# Patient Record
Sex: MALE | Race: WHITE | NOT HISPANIC OR LATINO | Employment: FULL TIME | URBAN - METROPOLITAN AREA
[De-identification: names, ages, dates, MRNs, and addresses within clinical notes are randomized per-mention and may not be internally consistent; named-entity substitution may affect disease eponyms.]

---

## 2017-04-12 ENCOUNTER — OFFICE VISIT (OUTPATIENT)
Dept: URGENT CARE | Age: 52
End: 2017-04-12
Payer: COMMERCIAL

## 2017-04-12 ENCOUNTER — TRANSCRIBE ORDERS (OUTPATIENT)
Dept: ADMINISTRATIVE | Age: 52
End: 2017-04-12

## 2017-04-12 ENCOUNTER — HOSPITAL ENCOUNTER (OUTPATIENT)
Dept: RADIOLOGY | Age: 52
Discharge: HOME | End: 2017-04-12
Attending: PHYSICIAN ASSISTANT | Admitting: FAMILY MEDICINE
Payer: COMMERCIAL

## 2017-04-12 DIAGNOSIS — M54.2 CERVICALGIA: ICD-10-CM

## 2017-04-12 PROCEDURE — 99283 EMERGENCY DEPT VISIT LOW MDM: CPT | Performed by: FAMILY MEDICINE

## 2017-04-12 PROCEDURE — 72040 X-RAY EXAM NECK SPINE 2-3 VW: CPT

## 2017-04-12 PROCEDURE — G0382 LEV 3 HOSP TYPE B ED VISIT: HCPCS | Performed by: FAMILY MEDICINE

## 2017-04-13 ENCOUNTER — HOSPITAL ENCOUNTER (EMERGENCY)
Facility: HOSPITAL | Age: 52
Discharge: HOME | End: 2017-04-13
Admitting: EMERGENCY MEDICINE
Payer: COMMERCIAL

## 2017-04-13 VITALS
HEART RATE: 74 BPM | RESPIRATION RATE: 20 BRPM | WEIGHT: 154 LBS | TEMPERATURE: 98.4 F | SYSTOLIC BLOOD PRESSURE: 116 MMHG | DIASTOLIC BLOOD PRESSURE: 92 MMHG | OXYGEN SATURATION: 96 %

## 2017-04-13 DIAGNOSIS — M54.2 CHRONIC NECK PAIN: Primary | ICD-10-CM

## 2017-04-13 DIAGNOSIS — G89.29 CHRONIC NECK PAIN: Primary | ICD-10-CM

## 2017-04-13 PROCEDURE — 99283 EMERGENCY DEPT VISIT LOW MDM: CPT

## 2018-12-20 ENCOUNTER — HOSPITAL ENCOUNTER (EMERGENCY)
Facility: HOSPITAL | Age: 53
Discharge: HOME | End: 2018-12-20
Attending: EMERGENCY MEDICINE | Admitting: EMERGENCY MEDICINE
Payer: COMMERCIAL

## 2018-12-20 VITALS
HEART RATE: 89 BPM | DIASTOLIC BLOOD PRESSURE: 95 MMHG | TEMPERATURE: 96.7 F | OXYGEN SATURATION: 98 % | WEIGHT: 165 LBS | RESPIRATION RATE: 18 BRPM | SYSTOLIC BLOOD PRESSURE: 149 MMHG

## 2018-12-20 DIAGNOSIS — F22 PARANOID DELUSION (HCC): Primary | ICD-10-CM

## 2018-12-20 LAB — ETHANOL EXG-MCNC: 0 MG/DL

## 2018-12-20 PROCEDURE — 82075 ASSAY OF BREATH ETHANOL: CPT | Performed by: EMERGENCY MEDICINE

## 2018-12-20 PROCEDURE — 99284 EMERGENCY DEPT VISIT MOD MDM: CPT

## 2018-12-20 NOTE — ED ATTENDING ATTESTATION
Jie Landa DO, saw and evaluated the patient  I have discussed the patient with the resident/non-physician practitioner and agree with the resident's/non-physician practitioner's findings, Plan of Care, and MDM as documented in the resident's/non-physician practitioner's note, except where noted  All available labs and Radiology studies were reviewed  At this point I agree with the current assessment done in the Emergency Department  I have conducted an independent evaluation of this patient a history and physical is as follows:    Patient is a 45-year-old male with no known past psychiatric history presenting for a psychiatric evaluation  Patient came to the hospital voluntarily by himself  He states that he is concerned because he was sleeping and thinks simply placed a metal device in his left ear or his the right ear or even his right ankle  He appears slightly anxious but denies any SI or HI  Denies any recent alcohol or drug abuse  Slightly pressured speech but otherwise the answering questions appropriately  Patient is afebrile, no signs of external trauma  The vital signs reviewed within normal range  Does not appear intoxicated  Does not appear under the influence of any narcotics or drugs  Patient was seen and evaluated by our crisis liaison  He did not wish to seek inpatient psychiatric care at this time  He was offered outpatient resources which she declined as well  Patient again denies SI or HI and since he came here voluntarily, patient be allowed to be discharged home  Instructed to return if condition worsens and follow up with his primary care physician  Patient was again instructed that he can return to the emergency department any time she wishes to seek treatment      Critical Care Time  CritCare Time    Procedures

## 2018-12-20 NOTE — ED NOTES
Pt is a 48 y o  male who was brought to the ED to get an axam to determine if he has some sort of device placed in his ear  Patient was very scattered and could not relate how he knew that the device was there or what it was doing to his body  Patient reports that it is the department of human services that put the device there because he has been living on a porch and they aren't happy about that  He states that they are trying to push him out and this is how they are going to do it  Patient expressed that he's been to the police station up to 10 times telling them this but there has been no evidence  Patient then stated that he wanted to go to his regular doctors who would be able to help him better; he had no insight into his actions to come here on his own regarding this issue  Patient then stated that he is very smart, has a very high IQ and so his senses are heightened and he knows that it is there  Patient also expressed that he uses Q-tips and therefore the device is probably lodged too far down for anyone but a specialist to find  Patient denies any mental health history and denies D&A use  Patient denies homicidal/suicidal ideations and auditory/visual hallucinations  Patient is not interested in any treatment at this time and is asking to leave  Discussed with doctor who is agreeable to such  Chief Complaint   Patient presents with    Psychiatric Evaluation     "pt states that the police sent him here to see if there is a metal device in his left ear that was placed while he was sleeping also stated that maybe the device is in his right ear or his ankle"  pt anxious during the triage process repeating that he has a metal object stuck in his body       Intake Assessment completed, Safety risk Assessment completed    BACILIO Dale  12/20/18   [id number]

## 2018-12-20 NOTE — DISCHARGE INSTRUCTIONS
Psychotic Disorder   WHAT YOU NEED TO KNOW:   A psychotic disorder is a medical condition that causes hallucinations and delusions  Hallucinations are seeing, hearing, tasting, or feeling things that are not real  Delusions are beliefs that something is real, true, or right when it is not  These false beliefs do not go away even if there is proof that they are not true  You may believe someone is spying on you, after you, or controlling your mind  You may also believe there is something wrong with how your body works  Schizophrenia and schizoaffective disorder are examples of psychotic disorders  DISCHARGE INSTRUCTIONS:   Call 911 if:   · You feel like you could harm yourself or someone else  Contact your healthcare provider if:   · Your symptoms do not improve  · You cannot make it to your next appointment  · You have new symptoms  · You have questions or concerns about your condition or care  Medicines  may be given to decrease your symptoms  You may need 1 or more medicines  You may need to take your medicine for several weeks before you begin to feel better  Tell your healthcare provider about any side effects or problems you have with your medicines  The type or amount of medicine may need to be changed  Get support: It may be difficult to cope with your illness  You may feel lonely, anxious, or depressed  It may help to join a support group  A support group lets you talk with others who have a mental illness  For information and more support visit:  · [facility]  [address] [facility]  , [address] [place] [address]  Phone: [phone]  Phone: [phone]  Web Address: http://"[employer]/  [employer]  Self-care:   · Do not drink alcohol or use illegal drugs  Alcohol and illegal drugs can make your symptoms worse  Ask your healthcare provider for information if you currently drink alcohol or use illegal drugs and need help to quit  · Do not smoke    Nicotine and other chemicals in cigarettes and cigars can cause lung damage  They can also decrease how well your medicine works  Ask your healthcare provider for information if you currently smoke and need help to quit  E-cigarettes or smokeless tobacco still contain nicotine  Talk to your healthcare provider before you use these products  · Exercise regularly  Exercise can help improve your mood and decrease symptoms  Ask about the best exercise plan for you  · Manage your stress  Stress can make your condition worse  Ask your healthcare provider for more information about practicing mindfulness and deep breathing exercises to help decrease your stress  You may learn other ways to manage stress during therapy  Follow up with your healthcare provider as directed:  Write down your questions so you remember to ask them during your visits  © 2017 [facility] Information is for End User's use only and may not be sold, redistributed or otherwise used for commercial purposes  All illustrations and images included in CareNotes® are the copyrighted property of [employer]  or Florian Swift  The above information is an  only  It is not intended as medical advice for individual conditions or treatments  Talk to your doctor, nurse or pharmacist before following any medical regimen to see if it is safe and effective for you

## 2018-12-21 NOTE — ED PROVIDER NOTES
History  Chief Complaint   Patient presents with    Psychiatric Evaluation     "pt states that the police sent him here to see if there is a metal device in his left ear that was placed while he was sleeping also stated that maybe the device is in his right ear or his ankle"  pt anxious during the triage process repeating that he has a metal object stuck in his body  27-year-old male otherwise healthy presenting to the emergency department with paranoid delusions that he has had piece of metal implanted in his left ear or in his left ankle  Patient states he has been kicked out of his home by his wife and has been living on his back porch and that the police were called he was at the police station today and told them that he was going to voluntarily come to the emergency department for evaluation of this metal object he claims to have in his head  Patient does not know who put this metal object in his head or why but knows that 1 is there and is requesting imaging  Patient denies any other symptoms he denies any suicidal or homicidal ideation denies any hallucinations denies any previous psychiatric history  Patient's remaining ROS is negative  Patient does not appear to be responding to internal stimuli but has a rapid pressured speech and is obviously having due to usual paranoid thoughts  Psychiatric Evaluation   Presenting symptoms: no agitation, no hallucinations, no suicidal thoughts, no suicidal threats and no suicide attempt    Degree of incapacity (severity):   Moderate  Onset quality:  Sudden  Timing:  Constant  Progression:  Unchanged  Chronicity:  New  Context: not alcohol use, not drug abuse, not medication and not noncompliant    Treatment compliance:  Untreated  Relieved by:  None tried  Ineffective treatments:  None tried  Associated symptoms: anxiety    Associated symptoms: no abdominal pain, no chest pain, no fatigue and no headaches        Prior to Admission Medications Prescriptions Last Dose Informant Patient Reported? Taking? CHROMIUM GTF PO   Yes No   Sig: Take 1 capsule by mouth Pt does not know dosage      Facility-Administered Medications: None       History reviewed  No pertinent past medical history  History reviewed  No pertinent surgical history  History reviewed  No pertinent family history  I have reviewed and agree with the history as documented  Social History   Substance Use Topics    Smoking status: Current Every Day Smoker     Packs/day: 0 50    Smokeless tobacco: Not on file    Alcohol use Yes      Comment: socially        Review of Systems   Constitutional: Negative for chills, fatigue and fever  HENT: Negative for sore throat  Eyes: Negative for visual disturbance  Respiratory: Negative for shortness of breath  Cardiovascular: Negative for chest pain  Gastrointestinal: Negative for abdominal pain, constipation, diarrhea, nausea and vomiting  Genitourinary: Negative for difficulty urinating, dysuria and hematuria  Musculoskeletal: Negative for arthralgias  Skin: Negative for rash  Neurological: Negative for syncope, weakness and headaches  Hematological: Negative for adenopathy  Psychiatric/Behavioral: Negative for agitation, behavioral problems, hallucinations and suicidal ideas  The patient is nervous/anxious and is hyperactive  All other systems reviewed and are negative        Physical Exam  ED Triage Vitals   Temperature Pulse Respirations Blood Pressure SpO2   12/20/18 1353 12/20/18 1353 12/20/18 1353 12/20/18 1354 12/20/18 1353   (!) 96 7 °F (35 9 °C) 89 18 149/95 98 %      Temp Source Heart Rate Source Patient Position - Orthostatic VS BP Location FiO2 (%)   12/20/18 1353 -- -- -- --   Tympanic          Pain Score       12/20/18 1353       No Pain           Orthostatic Vital Signs  Vitals:    12/20/18 1353 12/20/18 1354   BP:  149/95   Pulse: 89        Physical Exam   Constitutional: He is oriented to person, place, and time  He appears well-developed and well-nourished  HENT:   Head: Normocephalic and atraumatic  Eyes: Conjunctivae and EOM are normal  No scleral icterus  Neck: Normal range of motion  Neck supple  Cardiovascular: Normal rate and regular rhythm  No murmur heard  Pulmonary/Chest: Effort normal and breath sounds normal    Abdominal: Soft  Bowel sounds are normal  There is no tenderness  Musculoskeletal: Normal range of motion  Neurological: He is alert and oriented to person, place, and time  Skin: Skin is warm and dry  Psychiatric: He has a normal mood and affect  His speech is rapid and/or pressured and tangential  He is hyperactive  He expresses no homicidal and no suicidal ideation  He expresses no suicidal plans and no homicidal plans  Nursing note and vitals reviewed  ED Medications  Medications - No data to display    Diagnostic Studies  Results Reviewed     Procedure Component Value Units Date/Time    POCT alcohol breath test [[id number]]  (Normal) Resulted:  12/20/18 1452    Lab Status:  Final result Updated:  12/20/18 1452     EXTBreath Alcohol 0 000                 No orders to display         Procedures  Procedures      Phone Consults  ED Phone Contact    ED Course                               MDM  Number of Diagnoses or Management Options  Paranoid delusion [facility]): new and requires workup  Diagnosis management comments: 55-year-old male presenting with delusional thoughts, will order urine drug screen as well as breath alcohol test and have crisis evaluate the patient  After residents initial evaluation patient states he wants to leave AMA due to the pure are see of his time in the emergency department and as patient does not have any suicidal or homicidal ideation is not actively hallucinating or threatening anyone conversation was had with crisis and attending physician that the patient can leave AMA with follow-up with his PCP and other outside providers  Amount and/or Complexity of Data Reviewed  Clinical lab tests: ordered and reviewed  Tests in the medicine section of CPT®: ordered and reviewed  Obtain history from someone other than the patient: yes  Review and summarize past medical records: yes  Discuss the patient with other providers: yes      CritCare Time    Disposition  Final diagnoses:   Paranoid delusion ([address] )     Time reflects when diagnosis was documented in both MDM as applicable and the Disposition within this note     Time User Action Codes Description Comment    12/20/2018  3:46 PM Jennifer Richards Add [F22] Paranoid delusion [facility])       ED Disposition     ED Disposition Condition Comment    Discharge  Margaret Ferrer discharge to home/self care  Condition at discharge: Stable        Follow-up Information     Follow up With Specialties Details Why Contact Info Additional Information    J  Earnest Saldaña MD Internal Medicine   [address] [id number] [address]  Emergency Department Emergency Medicine   [address]  [phone]  ED, [address] [place] [address], [address] Psychiatry   [address] [facility],[address] [id number]  [address] [place] [address], [phone]          Discharge Medication List as of 12/20/2018  3:51 PM      CONTINUE these medications which have NOT CHANGED    Details   CHROMIUM GTF PO Take 1 capsule by mouth Pt does not know dosage, Until Discontinued, Historical Med           No discharge procedures on file  ED Provider  Attending physically available and evaluated Margaret Ferrer I managed the patient along with the ED Attending      Electronically Signed by         Jaime Gallardo MD  12/20/18 2023

## 2019-07-24 ENCOUNTER — HOSPITAL ENCOUNTER (EMERGENCY)
Facility: HOSPITAL | Age: 54
Discharge: HOME | End: 2019-07-24
Attending: EMERGENCY MEDICINE
Payer: COMMERCIAL

## 2019-07-24 VITALS
RESPIRATION RATE: 18 BRPM | HEART RATE: 76 BPM | SYSTOLIC BLOOD PRESSURE: 137 MMHG | TEMPERATURE: 97.4 F | DIASTOLIC BLOOD PRESSURE: 91 MMHG | OXYGEN SATURATION: 98 %

## 2019-07-24 DIAGNOSIS — H61.23 BILATERAL IMPACTED CERUMEN: Primary | ICD-10-CM

## 2019-07-24 PROCEDURE — 69209 REMOVE IMPACTED EAR WAX UNI: CPT | Performed by: EMERGENCY MEDICINE

## 2019-07-24 PROCEDURE — 99283 EMERGENCY DEPT VISIT LOW MDM: CPT | Performed by: EMERGENCY MEDICINE

## 2019-07-24 PROCEDURE — 99283 EMERGENCY DEPT VISIT LOW MDM: CPT

## 2019-07-24 RX ORDER — DOCUSATE SODIUM 100 MG/1
100 CAPSULE, LIQUID FILLED ORAL ONCE
Status: COMPLETED | OUTPATIENT
Start: 2019-07-24 | End: 2019-07-24

## 2019-07-24 RX ADMIN — DOCUSATE SODIUM 100 MG: 100 CAPSULE, LIQUID FILLED ORAL at 20:57

## 2019-07-24 NOTE — ED ATTENDING ATTESTATION
Charisse Peck MD, saw and evaluated the patient  I have discussed the patient with the resident/non-physician practitioner and agree with the resident's/non-physician practitioner's findings, Plan of Care, and MDM as documented in the resident's/non-physician practitioner's note, except where noted  All available labs and Radiology studies were reviewed  I was present for key portions of any procedure(s) performed by the resident/non-physician practitioner and I was immediately available to provide assistance  At this point I agree with the current assessment done in the Emergency Department  I have conducted an independent evaluation of this patient a history and physical is as follows:     Pt presents with bilat cerumen impaction Pt states he has been using debrox without relief Pt has difficulty hearing  NO pain no drainage PE: alert bilat cerumen impaction MDM: will do irrigation  Critical Care Time  Procedures

## 2019-07-25 NOTE — ED PROVIDER NOTES
History  Chief Complaint   Patient presents with    Hearing Problem     "I can't hear at all, both of my ears are shut  It's a hazard for me to drive or work " Pt reports hearing worsening over the past week  Everton Livingston is a 48y o  year old Male with PMH of cerumen impaction presenting to the [facility] beds at the [facility] ED for decreased hearing  Worsening over past 2 days  Using debrox w/o relief  Denies congestion, rhinorrhea, veritgo  ROS negative  History provided by:  Patient   used: No        Prior to Admission Medications   Prescriptions Last Dose Informant Patient Reported? Taking? CHROMIUM GTF PO Not Taking at Unknown time  Yes No   Sig: Take 1 capsule by mouth Pt does not know dosage      Facility-Administered Medications: None       History reviewed  No pertinent past medical history  History reviewed  No pertinent surgical history  History reviewed  No pertinent family history  I have reviewed and agree with the history as documented  Social History     Tobacco Use    Smoking status: Current Every Day Smoker     Packs/day: 0 50   Substance Use Topics    Alcohol use: Yes     Comment: socially    Drug use: No        Review of Systems   Constitutional: Negative for chills, diaphoresis and fever  HENT: Negative for nosebleeds and rhinorrhea  Respiratory: Negative for cough, chest tightness, shortness of breath and wheezing  Cardiovascular: Negative for chest pain, palpitations and leg swelling  Gastrointestinal: Negative for abdominal distention, abdominal pain, constipation, diarrhea, nausea and vomiting  Genitourinary: Negative for dysuria, flank pain and hematuria  Musculoskeletal: Negative for arthralgias and myalgias  Neurological: Negative for seizures and syncope  Psychiatric/Behavioral: Negative for confusion  All other systems reviewed and are negative        Physical Exam  ED Triage Vitals [07/24/19 1845]   Temperature Pulse Respirations Blood Pressure SpO2   (!) 97 4 °F (36 3 °C) 76 18 137/91 98 %      Temp Source Heart Rate Source Patient Position - Orthostatic VS BP Location FiO2 (%)   Oral Monitor Sitting Left arm --      Pain Score       --             Orthostatic Vital Signs  Vitals:    07/24/19 1845   BP: 137/91   Pulse: 76   Patient Position - Orthostatic VS: Sitting       Physical Exam   Constitutional: He is oriented to person, place, and time  He appears well-developed and well-nourished  HENT:   Right Ear: No tenderness  Decreased hearing is noted  Left Ear: No tenderness  Decreased hearing is noted  B/l cerumen impaction  No erythema  No external tenderness  Cardiovascular: Normal rate and regular rhythm  No murmur heard  Pulmonary/Chest: Effort normal and breath sounds normal  No respiratory distress  He has no wheezes  He has no rales  Abdominal: Soft  Bowel sounds are normal  There is no tenderness  There is no rebound and no guarding  Neurological: He is alert and oriented to person, place, and time  Skin: Skin is warm  Capillary refill takes less than 2 seconds  Psychiatric: He has a normal mood and affect  His behavior is normal    Nursing note and vitals reviewed  ED Medications  Medications   docusate sodium (COLACE) capsule 100 mg (100 mg Oral Given 7/24/19 2057)       Diagnostic Studies  Results Reviewed     None                 No orders to display         Procedures  Procedures        ED Course  ED Course as of Jul 24 2240 Wed Jul 24, 2019 2130 Ear irrigated with warm water and colace  Removal of moderate amount of cerumen  Hearing improved  Will DC w/ RTED and follow up w/ PCP as needed                                    MDM    Disposition  Final diagnoses:   Bilateral impacted cerumen     Time reflects when diagnosis was documented in both MDM as applicable and the Disposition within this note     Time User Action Codes Description Comment    7/24/2019  8:55 PM Natalie Glass [A85 [id number]] Bilateral impacted cerumen       ED Disposition     ED Disposition Condition Date/Time Comment    Discharge Stable Wed Jul 24, 2019  8:55 PM Yaneth Nur discharge to home/self care  Follow-up Information     Follow up With Specialties Details Why Contact Wilberto Zuñiga MD Internal Medicine Schedule an appointment as soon as possible for a visit  To make appointment for reevaluation in 3-5 days  [address]  [address]  [phone]            Discharge Medication List as of 7/24/2019  9:29 PM      CONTINUE these medications which have NOT CHANGED    Details   CHROMIUM GTF PO Take 1 capsule by mouth Pt does not know dosage, Until Discontinued, Historical Med           No discharge procedures on file  ED Provider  Attending physically available and evaluated Yaneth Nur I managed the patient along with the ED Attending      Electronically Signed by Ruslan Mcallister W Chanda , DO  07/24/19 0638

## 2019-07-31 ENCOUNTER — HOSPITAL ENCOUNTER (EMERGENCY)
Facility: HOSPITAL | Age: 54
Discharge: HOME | End: 2019-07-31
Attending: EMERGENCY MEDICINE
Payer: COMMERCIAL

## 2019-07-31 VITALS
SYSTOLIC BLOOD PRESSURE: 135 MMHG | TEMPERATURE: 98.4 F | HEIGHT: 69 IN | WEIGHT: 162 LBS | HEART RATE: 75 BPM | DIASTOLIC BLOOD PRESSURE: 88 MMHG | RESPIRATION RATE: 20 BRPM | BODY MASS INDEX: 23.99 KG/M2 | OXYGEN SATURATION: 97 %

## 2019-07-31 DIAGNOSIS — K04.7 DENTAL INFECTION: Primary | ICD-10-CM

## 2019-07-31 PROCEDURE — 99283 EMERGENCY DEPT VISIT LOW MDM: CPT | Performed by: EMERGENCY MEDICINE

## 2019-07-31 PROCEDURE — 99283 EMERGENCY DEPT VISIT LOW MDM: CPT

## 2019-07-31 RX ORDER — CLINDAMYCIN HYDROCHLORIDE 300 MG/1
300 CAPSULE ORAL EVERY 6 HOURS
Qty: 28 CAPSULE | Refills: 0 | Status: SHIPPED | OUTPATIENT
Start: 2019-07-31 | End: 2019-08-07

## 2019-07-31 RX ORDER — CLINDAMYCIN HYDROCHLORIDE 150 MG/1
300 CAPSULE ORAL ONCE
Status: COMPLETED | OUTPATIENT
Start: 2019-07-31 | End: 2019-07-31

## 2019-07-31 RX ORDER — NAPROXEN 500 MG/1
500 TABLET ORAL ONCE
Status: COMPLETED | OUTPATIENT
Start: 2019-07-31 | End: 2019-07-31

## 2019-07-31 RX ORDER — CHLORHEXIDINE GLUCONATE 0.12 MG/ML
RINSE ORAL
Qty: 120 ML | Refills: 0 | Status: SHIPPED | OUTPATIENT
Start: 2019-07-31 | End: 2019-09-05

## 2019-07-31 RX ADMIN — CLINDAMYCIN HYDROCHLORIDE 300 MG: 150 CAPSULE ORAL at 01:12

## 2019-07-31 RX ADMIN — NAPROXEN 500 MG: 500 TABLET ORAL at 01:13

## 2019-07-31 NOTE — ED ATTENDING ATTESTATION
Pietro Nicole DO, saw and evaluated the patient  I have discussed the patient with the resident/non-physician practitioner and agree with the resident's/non-physician practitioner's findings, Plan of Care, and MDM as documented in the resident's/non-physician practitioner's note, except where noted  All available labs and Radiology studies were reviewed  I was present for key portions of any procedure(s) performed by the resident/non-physician practitioner and I was immediately available to provide assistance  At this point I agree with the current assessment done in the Emergency Department  I have conducted an independent evaluation of this patient a history and physical is as follows:    47 yo male presents for evaluation of acute onset R sided facial swelling localized to R mandible  Denies significant pain, has some pain at #32 but swelling is adjacent to #30  Constant since onset, no a/e factors  Non radiating  Denies fever, difficulty opening mouth or swallowing  No other c/o at this time  Focal swelling adjacent to #30  No erythema of cheek  There is no fluctuance of the swelling  Area of swelling is non TTP  Poor dentition with multiple eroded teeth with embedded roots  No trismus  Tolerating secretions  Submandibular space soft  Neck supple no meningismus  No stridor    Imp: R sided facial swelling likely odontogenic infx plan: clindamycin,peridex,  f/u OMS          Critical Care Time  Procedures

## 2019-07-31 NOTE — ED PROVIDER NOTES
History  Chief Complaint   Patient presents with    Dental Pain     Patient reports ongoing problem with dental abscess  Reports he feels that it is spreading down to throat  No SOB/breathing difficulty   Facial Swelling     47 yo M presents to ED for R lower dental pain with facial swelling  Pt says symptoms started two days ago  First noticed the swelling visibly today  Pt already has appointment with [facility] tomorrow but is concerned because he thinks the swelling might be spreading down the side of his neck  Denies any difficulty swallowing or breathing  No voice change  No fever/chills  Pt says he has had multiple similar problems before and knows he needs the tooth pulled  Not on antibiotics currently  Prior to Admission Medications   Prescriptions Last Dose Informant Patient Reported? Taking? CHROMIUM GTF PO   Yes No   Sig: Take 1 capsule by mouth Pt does not know dosage      Facility-Administered Medications: None       History reviewed  No pertinent past medical history  History reviewed  No pertinent surgical history  History reviewed  No pertinent family history  I have reviewed and agree with the history as documented  Social History     Tobacco Use    Smoking status: Current Every Day Smoker     Packs/day: 0 50   Substance Use Topics    Alcohol use: Yes     Comment: socially    Drug use: No        Review of Systems   Constitutional: Negative for chills, fatigue and fever  HENT: Positive for dental problem and facial swelling  Negative for congestion, drooling, rhinorrhea, sore throat and trouble swallowing  Eyes: Negative for pain, discharge and visual disturbance  Respiratory: Negative for cough, chest tightness and shortness of breath  Cardiovascular: Negative for chest pain, palpitations and leg swelling  Gastrointestinal: Negative for abdominal pain, nausea and vomiting  Genitourinary: Negative for decreased urine volume, dysuria, frequency and urgency  Musculoskeletal: Negative for gait problem, joint swelling and neck stiffness  Skin: Negative for color change, rash and wound  Neurological: Negative for dizziness, syncope, light-headedness and headaches  Physical Exam  ED Triage Vitals [07/31/19 0045]   Temperature Pulse Respirations Blood Pressure SpO2   98 4 °F (36 9 °C) 75 20 135/88 97 %      Temp Source Heart Rate Source Patient Position - Orthostatic VS BP Location FiO2 (%)   Tympanic Monitor Sitting Left arm --      Pain Score       6             Orthostatic Vital Signs  Vitals:    07/31/19 0045   BP: 135/88   Pulse: 75   Patient Position - Orthostatic VS: Sitting       Physical Exam   Constitutional: He is oriented to person, place, and time  He appears well-developed and well-nourished  No distress  HENT:   Head: Normocephalic and atraumatic  Mouth/Throat: Oropharynx is clear and moist  No oropharyngeal exudate  R lower facial swelling  Palpable area of swelling without significant fluctuance around tooth #30  Tooth posterior to that tender to palpation  Eyes: Conjunctivae and EOM are normal  Right eye exhibits no discharge  Left eye exhibits no discharge  Neck: Normal range of motion  Neck supple  Nontender  No palpable swelling in the neck   Cardiovascular: Normal rate, regular rhythm and intact distal pulses  Pulmonary/Chest: Effort normal and breath sounds normal  No stridor  No respiratory distress  He exhibits no tenderness  Abdominal: Soft  Bowel sounds are normal  There is no tenderness  There is no rebound and no guarding  Musculoskeletal: Normal range of motion  He exhibits no edema or tenderness  Neurological: He is alert and oriented to person, place, and time  No sensory deficit  He exhibits normal muscle tone  Skin: Skin is warm and dry  Capillary refill takes less than 2 seconds  Nursing note and vitals reviewed        ED Medications  Medications   clindamycin (CLEOCIN) capsule 300 mg (300 mg Oral Given 7/31/19 0112)   naproxen (NAPROSYN) tablet 500 mg (500 mg Oral Given 7/31/19 0113)       Diagnostic Studies  Results Reviewed     None                 No orders to display         Procedures  Procedures        ED Course                               MDM  Number of Diagnoses or Management Options  Dental infection:   Diagnosis management comments: No significant fluid collection on bedside ultrasound for drainage  Pt well appearing, afebrile  Facial swelling but no neck swelling  Posterior oropharynx unremarkable  Pt with penicillin allergy  Will give Rx for clindamycin and chlorhexidine rinse  Naproxen and first dose clindamycin given in ED  Follow up with OMFS tomorrow  Disposition  Final diagnoses:   Dental infection     Time reflects when diagnosis was documented in both MDM as applicable and the Disposition within this note     Time User Action Codes Description Comment    7/31/2019  1:08 AM Aleksandr Delgado Add [K04 7] Dental infection       ED Disposition     ED Disposition Condition Date/Time Comment    Discharge Stable Wed Jul 31, 2019  1:08 AM Wali Sanchez discharge to home/self care              Follow-up Information     Follow up With Specialties Details Why Contact Info Additional [address] Emergency Department Emergency Medicine  As needed, If symptoms worsen [address] [place] [address]  [phone]  ED, [address], [address] [place]  Schedule an appointment as soon as possible for a visit in 1 day  [facility]           Discharge Medication List as of 7/31/2019  1:12 AM      START taking these medications    Details   chlorhexidine (PERIDEX) 0 12 % solution Swish for 30 seconds and spit 15 mL twice a day, Print      clindamycin (CLEOCIN) 300 MG capsule Take 1 capsule (300 mg total) by mouth every 6 (six) hours for 7 days, Starting Wed 7/31/2019, Until Wed 8/7/2019, Print         CONTINUE these medications which have NOT CHANGED    Details   CHROMIUM GTF PO Take 1 capsule by mouth Pt does not know dosage, Until Discontinued, Historical Med           No discharge procedures on file  ED Provider  Attending physically available and evaluated Kaley Fields I managed the patient along with the ED Attending      Electronically Signed by         Cherrie Boas, MD  07/31/19 9626

## 2019-09-05 ENCOUNTER — HOSPITAL ENCOUNTER (EMERGENCY)
Facility: HOSPITAL | Age: 54
Discharge: HOME | End: 2019-09-05
Attending: EMERGENCY MEDICINE | Admitting: EMERGENCY MEDICINE
Payer: COMMERCIAL

## 2019-09-05 VITALS
OXYGEN SATURATION: 95 % | DIASTOLIC BLOOD PRESSURE: 82 MMHG | RESPIRATION RATE: 16 BRPM | WEIGHT: 165.57 LBS | HEIGHT: 68 IN | HEART RATE: 60 BPM | TEMPERATURE: 97.5 F | SYSTOLIC BLOOD PRESSURE: 130 MMHG | BODY MASS INDEX: 25.09 KG/M2

## 2019-09-05 DIAGNOSIS — R44.3 HALLUCINATIONS: Primary | ICD-10-CM

## 2019-09-05 LAB — ETHANOL EXG-MCNC: 0 MG/DL

## 2019-09-05 PROCEDURE — 99285 EMERGENCY DEPT VISIT HI MDM: CPT

## 2019-09-05 PROCEDURE — 82075 ASSAY OF BREATH ETHANOL: CPT | Performed by: EMERGENCY MEDICINE

## 2019-09-05 PROCEDURE — 99284 EMERGENCY DEPT VISIT MOD MDM: CPT | Performed by: EMERGENCY MEDICINE
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
## 2020-01-28 ENCOUNTER — APPOINTMENT (EMERGENCY)
Dept: RADIOLOGY | Facility: HOSPITAL | Age: 55
End: 2020-01-28
Payer: COMMERCIAL

## 2020-01-28 ENCOUNTER — APPOINTMENT (EMERGENCY)
Dept: CT IMAGING | Facility: HOSPITAL | Age: 55
End: 2020-01-28
Payer: COMMERCIAL

## 2020-01-28 ENCOUNTER — HOSPITAL ENCOUNTER (EMERGENCY)
Facility: HOSPITAL | Age: 55
Discharge: HOME | End: 2020-01-28
Attending: EMERGENCY MEDICINE | Admitting: EMERGENCY MEDICINE
Payer: COMMERCIAL

## 2020-01-28 VITALS
DIASTOLIC BLOOD PRESSURE: 93 MMHG | OXYGEN SATURATION: 98 % | SYSTOLIC BLOOD PRESSURE: 130 MMHG | BODY MASS INDEX: 26.05 KG/M2 | HEART RATE: 94 BPM | TEMPERATURE: 98.2 F | RESPIRATION RATE: 18 BRPM | WEIGHT: 171.3 LBS

## 2020-01-28 DIAGNOSIS — Z00.8 ENCOUNTER FOR PSYCHOLOGICAL EVALUATION: Primary | ICD-10-CM

## 2020-01-28 LAB
AMPHETAMINES SERPL QL SCN: NEGATIVE
ANION GAP SERPL CALCULATED.3IONS-SCNC: 9 MMOL/L (ref 5–14)
ATRIAL RATE: 79 BPM
BARBITURATES UR QL: NEGATIVE
BASOPHILS # BLD AUTO: 0.05 THOUSAND/UL (ref 0–0.1)
BASOPHILS NFR MAR MANUAL: 1 % (ref 0–1)
BENZODIAZ UR QL: NEGATIVE
BUN SERPL-MCNC: 17 MG/DL (ref 5–25)
CALCIUM SERPL-MCNC: 9.4 MG/DL (ref 8.4–10.2)
CHLORIDE SERPL-SCNC: 101 MMOL/L (ref 97–108)
CO2 SERPL-SCNC: 28 MMOL/L (ref 22–30)
COCAINE UR QL: NEGATIVE
CREAT SERPL-MCNC: 0.91 MG/DL (ref 0.7–1.5)
EOSINOPHIL # BLD AUTO: 0.05 THOUSAND/UL (ref 0–0.4)
EOSINOPHIL NFR BLD MANUAL: 1 % (ref 0–6)
ERYTHROCYTE [DISTWIDTH] IN BLOOD BY AUTOMATED COUNT: 14.5 %
GFR SERPL CREATININE-BSD FRML MDRD: 95 ML/MIN/1.73SQ M
GLUCOSE SERPL-MCNC: 95 MG/DL (ref 70–99)
HCT VFR BLD AUTO: 48.5 % (ref 41–53)
HGB BLD-MCNC: 16 G/DL (ref 13.5–17.5)
LYMPHOCYTES # BLD AUTO: 1.01 THOUSAND/UL (ref 0.5–4)
LYMPHOCYTES # BLD AUTO: 22 % (ref 25–45)
MCH RBC QN AUTO: 27.2 PG (ref 26–34)
MCHC RBC AUTO-ENTMCNC: 33 G/DL (ref 31–36)
MCV RBC AUTO: 82 FL (ref 80–100)
METHADONE UR QL: NEGATIVE
MONOCYTES # BLD AUTO: 0.46 THOUSAND/UL (ref 0.2–0.9)
MONOCYTES NFR BLD AUTO: 10 % (ref 1–10)
NEUTS BAND NFR BLD MANUAL: 11 % (ref 0–8)
NEUTS SEG # BLD: 2.85 THOUSAND/UL (ref 1.8–7.8)
NEUTS SEG NFR BLD AUTO: 51 %
OPIATES UR QL SCN: NEGATIVE
P AXIS: 46 DEGREES
PCP UR QL: NEGATIVE
PLATELET # BLD AUTO: 221 THOUSANDS/UL (ref 150–450)
PLATELET BLD QL SMEAR: ADEQUATE
PMV BLD AUTO: 7.7 FL (ref 8.9–12.7)
POTASSIUM SERPL-SCNC: 4.1 MMOL/L (ref 3.6–5)
PR INTERVAL: 118 MS
QRS AXIS: -53 DEGREES
QRSD INTERVAL: 90 MS
QT INTERVAL: 340 MS
QTC INTERVAL: 389 MS
RBC # BLD AUTO: 5.89 MILLION/UL (ref 4.5–5.9)
RBC MORPH BLD: NORMAL
SODIUM SERPL-SCNC: 138 MMOL/L (ref 137–147)
T WAVE AXIS: 29 DEGREES
THC UR QL: NEGATIVE
TOTAL CELLS COUNTED SPEC: 100
TSH SERPL DL<=0.05 MIU/L-ACNC: 2.38 UIU/ML (ref 0.47–4.68)
VARIANT LYMPHS # BLD AUTO: 4 % (ref 0–0)
VENTRICULAR RATE: 79 BPM
WBC # BLD AUTO: 4.6 THOUSAND/UL (ref 4.5–11)

## 2020-01-28 PROCEDURE — 99284 EMERGENCY DEPT VISIT MOD MDM: CPT | Performed by: EMERGENCY MEDICINE

## 2020-01-28 PROCEDURE — 80048 BASIC METABOLIC PNL TOTAL CA: CPT | Performed by: EMERGENCY MEDICINE

## 2020-01-28 PROCEDURE — 93005 ELECTROCARDIOGRAM TRACING: CPT

## 2020-01-28 PROCEDURE — 99285 EMERGENCY DEPT VISIT HI MDM: CPT

## 2020-01-28 PROCEDURE — 70450 CT HEAD/BRAIN W/O DYE: CPT

## 2020-01-28 PROCEDURE — 71046 X-RAY EXAM CHEST 2 VIEWS: CPT

## 2020-01-28 PROCEDURE — 93010 ELECTROCARDIOGRAM REPORT: CPT | Performed by: INTERNAL MEDICINE

## 2020-01-28 PROCEDURE — 80307 DRUG TEST PRSMV CHEM ANLYZR: CPT | Performed by: EMERGENCY MEDICINE

## 2020-01-28 PROCEDURE — 85007 BL SMEAR W/DIFF WBC COUNT: CPT | Performed by: EMERGENCY MEDICINE

## 2020-01-28 PROCEDURE — 84443 ASSAY THYROID STIM HORMONE: CPT | Performed by: EMERGENCY MEDICINE

## 2020-01-28 PROCEDURE — 85027 COMPLETE CBC AUTOMATED: CPT | Performed by: EMERGENCY MEDICINE

## 2020-01-28 PROCEDURE — 36415 COLL VENOUS BLD VENIPUNCTURE: CPT | Performed by: EMERGENCY MEDICINE

## 2020-01-28 NOTE — ED PROVIDER NOTES
History  Chief Complaint   Patient presents with    Psychiatric Evaluation     states sent by [employer]; denies SI, HI, AH, VH       History provided by:  Patient  Psychiatric Evaluation   Presenting symptoms: delusional and hallucinations    Presenting symptoms: no suicidal thoughts    Degree of incapacity (severity): Moderate  Onset quality:  Gradual  Timing:  Constant  Progression:  Worsening  Chronicity:  New  Treatment compliance: All of the time  Relieved by:  Nothing  Worsened by:  Nothing  Ineffective treatments:  None tried  Associated symptoms: no abdominal pain, no anhedonia, no chest pain and no headaches        None       Past Medical History:   Diagnosis Date    Psychiatric illness     Psychosis ([facility] [address] )        History reviewed  No pertinent surgical history  History reviewed  No pertinent family history  I have reviewed and agree with the history as documented  Social History     Tobacco Use    Smoking status: Former Smoker     Packs/day: 0 50    Smokeless tobacco: Current User   Substance Use Topics    Alcohol use: Yes     Comment: socially    Drug use: No        Review of Systems   Constitutional: Negative for chills and fever  HENT: Negative for rhinorrhea, sore throat and trouble swallowing  Eyes: Negative for pain  Respiratory: Negative for cough, shortness of breath, wheezing and stridor  Cardiovascular: Negative for chest pain and leg swelling  Gastrointestinal: Negative for abdominal pain, diarrhea and nausea  Endocrine: Negative for polyuria  Genitourinary: Negative for dysuria, flank pain and urgency  Musculoskeletal: Negative for joint swelling, myalgias and neck stiffness  Skin: Negative for rash  Allergic/Immunologic: Negative for immunocompromised state  Neurological: Negative for dizziness, syncope, weakness, numbness and headaches  Psychiatric/Behavioral: Positive for hallucinations  Negative for confusion and suicidal ideas     All other systems reviewed and are negative  Physical Exam  Physical Exam   Constitutional: He is oriented to person, place, and time  He appears well-developed and well-nourished  HENT:   Head: Normocephalic and atraumatic  Eyes: Pupils are equal, round, and reactive to light  EOM are normal    Neck: Normal range of motion  Neck supple  Cardiovascular: Normal rate and regular rhythm  Exam reveals no friction rub  No murmur heard  Pulmonary/Chest: Breath sounds normal  No respiratory distress  He has no wheezes  He has no rales  Abdominal: Soft  Bowel sounds are normal  He exhibits no distension  There is no tenderness  Musculoskeletal: Normal range of motion  He exhibits no edema or tenderness  Neurological: He is alert and oriented to person, place, and time  Skin: Skin is warm  No rash noted  Psychiatric: Thought content is paranoid and delusional  He expresses no homicidal and no suicidal ideation  He expresses no suicidal plans and no homicidal plans  Nursing note and vitals reviewed  Vital Signs  ED Triage Vitals [01/28/20 1337]   Temperature Pulse Respirations Blood Pressure SpO2   98 2 °F (36 8 °C) 94 18 130/93 98 %      Temp Source Heart Rate Source Patient Position - Orthostatic VS BP Location FiO2 (%)   Tympanic Monitor Sitting Left arm --      Pain Score       No Pain           Vitals:    01/28/20 1337   BP: 130/93   Pulse: 94   Patient Position - Orthostatic VS: Sitting         Visual Acuity      ED Medications  Medications - No data to display    Diagnostic Studies  Results Reviewed     Procedure Component Value Units Date/Time    TSH [[id number]]  (Normal) Collected:  01/28/20 1412    Lab Status:  Final result Specimen:  Blood from Arm, Right Updated:  01/28/20 1504     TSH 3RD GENERATON 2 380 uIU/mL     Narrative:       Patients undergoing fluorescein dye angiography may retain small amounts of fluorescein in the body for 48-72 hours post procedure   Samples containing fluorescein can produce falsely depressed TSH values  If the patient had this procedure,a specimen should be resubmitted post fluorescein clearance  Rapid drug screen, urine [[id number]]  (Normal) Collected:  01/28/20 1412    Lab Status:  Final result Specimen:  Urine, Other Updated:  01/28/20 1440     Amph/Meth UR Negative     Barbiturate Ur Negative     Benzodiazepine Urine Negative     Cocaine Urine Negative     Methadone Urine Negative     Opiate Urine Negative     PCP Ur Negative     THC Urine Negative    Narrative:       FOR MEDICAL PURPOSES ONLY  IF CONFIRMATION NEEDED PLEASE CONTACT THE LAB WITHIN 5 DAYS      Drug Screen Cutoff Levels:  AMPHETAMINE/METHAMPHETAMINES  1000 ng/mL  BARBITURATES     200 ng/mL  BENZODIAZEPINES     200 ng/mL  COCAINE      300 ng/mL  METHADONE      300 ng/mL  OPIATES      300 ng/mL  PHENCYCLIDINE     25 ng/mL  THC       50 ng/mL      Basic metabolic panel [[id number]]  (Normal) Collected:  01/28/20 1412    Lab Status:  Final result Specimen:  Blood from Arm, Right Updated:  01/28/20 1428     Sodium 138 mmol/L      Potassium 4 1 mmol/L      Chloride 101 mmol/L      CO2 28 mmol/L      ANION GAP 9 mmol/L      BUN 17 mg/dL      Creatinine 0 91 mg/dL      Glucose 95 mg/dL      Calcium 9 4 mg/dL      eGFR 95 ml/min/1 73sq m     Narrative:       [place] guidelines for Chronic Kidney Disease (CKD):     Stage 1 with normal or high GFR (GFR > 90 mL/min/1 73 square meters)    Stage 2 Mild CKD (GFR = 60-89 mL/min/1 73 square meters)    Stage 3A Moderate CKD (GFR = 45-59 mL/min/1 73 square meters)    Stage 3B Moderate CKD (GFR = 30-44 mL/min/1 73 square meters)    Stage 4 Severe CKD (GFR = 15-29 mL/min/1 73 square meters)    Stage 5 End Stage CKD (GFR <15 mL/min/1 73 square meters)  Note: GFR calculation is accurate only with a steady state creatinine    CBC and differential [[id number]]  (Abnormal) Collected:  01/28/20 1412    Lab Status:  Final result Specimen:  Blood from Arm, Right Updated:  01/28/20 1421     WBC 4 60 Thousand/uL      RBC 5 89 Million/uL      Hemoglobin 16 0 g/dL      Hematocrit 48 5 %      MCV 82 fL      MCH 27 2 pg      MCHC 33 0 g/dL      RDW 14 5 %      MPV 7 7 fL      Platelets 220 Thousands/uL                  XR chest 2 views   Final Result by Elsa Levi MD (01/28 3360)      No acute cardiopulmonary disease  Workstation performed: [id number]         CT head without contrast   Final Result by Mikal Reyes MD (01/28 4619)      No acute intracranial abnormality  Workstation performed: [id number]                    Procedures  Procedures         ED Course                               MDM  Number of Diagnoses or Management Options  Encounter for psychological evaluation: new and requires workup  Diagnosis management comments: This is a 15-year-old male who presents emergency department with noted at delusions and paranoia  Patient is not suicidal not homicidal   He was seen in the emergency department  He was evaluated with CT scan  He was evaluated with workup from psychiatric perspective  I spoke with the crisis worker as well spoke with the patient he is not suicidal not homicidal there is no grounds for 302 at this point time  He is able to be discharged he does not want stay in the hospital   He stated that there is a metal chip noted in his brain at was implanted by the of [facility]  No evidence on CT scan informed the patient that no evidence of that as well  Pt re-examined and evaluated after testing and treatment  Spoke with the patient and feeling improved and sxs have resolved  Will discharge home with close f/u with pcp and instructed to return to the ED if sxs worsen or continue  Pt agrees with the plan for discharge and feels comfortable to go home with proper f/u  Advised to return for worsening or additional problems  Diagnostic tests were reviewed and questions answered   Diagnosis, care plan and treatment options were discussed  The patient understand instructions and will follow up as directed  Counseling: I had a detailed discussion with the patient and/or guardian regarding: the historical points, exam findings, and any diagnostic results supporting the discharge diagnosis, lab results, radiology results, discharge instructions reviewed with patient and/or family/caregiver and understanding was verbalized  Instructions given to return to the emergency department if symptoms worsen or persist, or if there are any questions or concerns that arise at home  All labs reviewed and utilized in the medical decision making process    All radiology studies independently viewed by me and interpreted by the radiologist        Amount and/or Complexity of Data Reviewed  Clinical lab tests: ordered and reviewed  Tests in the radiology section of CPT®: ordered and reviewed  Independent visualization of images, tracings, or specimens: yes          Disposition  Final diagnoses:   Encounter for psychological evaluation     Time reflects when diagnosis was documented in both MDM as applicable and the Disposition within this note     Time User Action Codes Description Comment    1/28/2020  4:38 PM Darrius Haley Add [Z00 8] Encounter for psychological evaluation       ED Disposition     ED Disposition Condition Date/Time Comment    Discharge Stable Tu Jan 28, 2020  4:38 PM Bay Mendez discharge to home/self care  MD Documentation      Most Recent Value   Sending MD Filippo Patel DO      Follow-up Information     Follow up With Specialties Details Why Contact Wilberto Schulz MD Internal Medicine Schedule an appointment as soon as possible for a visit   [phone]  [place] [phone]            There are no discharge medications for this patient  No discharge procedures on file      ED Provider  Electronically Signed by           Kashmir Mera DO  01/29/20 5081

## 2020-01-28 NOTE — ED NOTES
The patient would benefit from an inpatient admission but refuses to sign in voluntarily  History indicates this has been his baseline  He is not suicidal or homicidal, has rational plans for the evening  Therefore a 302 is not indicated at this time

## 2020-01-28 NOTE — ED NOTES
Pt reports [employer] sent him here to have the device in his ear looked at and is requesting abx and cough medicine for a "lung infection" Denies SI/HI "why would I do that?  I'm a professional golfer"     Nikhil Ordonez, KRISTIN  01/28/20 7817

## 2020-01-28 NOTE — ED NOTES
Patient presented as manic with paranoid and grandiose delusions  He denies a history of mental health issues; however, there is a history of presenting similarly in the past   Patient states he works for the [employer] and [employer]  He states he need a body scan to check the status of a nuerotransmitter chip that is implanted in his body  He adamantly denies suicidal and homicidal ideation  He denies A/V hallucinations although he has a history of receiving messages and instructions from the police in his left ear  Patient states he has a 300 IQ  He reports he has 2 sons that have completed college and are grown and working  He would like to meet a nice woman and have another child because he feels he is still young  Patient has no insight into his condition; He was offered a 201 and refused this stating he does not feel that he has any mental health issues  HE states he does see his PCP annually and as needed  He stated he would return to the ED if he ever did feel suicidal or homicidal but stated he cannot forsee that ever happening as he is happy and has his sons to live for  He has no unusual plans this evening

## 2020-02-03 ENCOUNTER — HOSPITAL ENCOUNTER (EMERGENCY)
Facility: HOSPITAL | Age: 55
Discharge: HOME | End: 2020-02-03
Attending: EMERGENCY MEDICINE | Admitting: EMERGENCY MEDICINE
Payer: COMMERCIAL

## 2020-02-03 ENCOUNTER — APPOINTMENT (EMERGENCY)
Dept: RADIOLOGY | Facility: HOSPITAL | Age: 55
End: 2020-02-03
Payer: COMMERCIAL

## 2020-02-03 VITALS
SYSTOLIC BLOOD PRESSURE: 117 MMHG | RESPIRATION RATE: 20 BRPM | WEIGHT: 164 LBS | TEMPERATURE: 99.6 F | BODY MASS INDEX: 24.86 KG/M2 | HEIGHT: 68 IN | DIASTOLIC BLOOD PRESSURE: 75 MMHG | HEART RATE: 82 BPM | OXYGEN SATURATION: 97 %

## 2020-02-03 DIAGNOSIS — R05.9 COUGH: Primary | ICD-10-CM

## 2020-02-03 DIAGNOSIS — J06.9 URI (UPPER RESPIRATORY INFECTION): ICD-10-CM

## 2020-02-03 PROCEDURE — 99283 EMERGENCY DEPT VISIT LOW MDM: CPT

## 2020-02-03 PROCEDURE — 71046 X-RAY EXAM CHEST 2 VIEWS: CPT

## 2020-02-03 PROCEDURE — 94640 AIRWAY INHALATION TREATMENT: CPT

## 2020-02-03 PROCEDURE — 99284 EMERGENCY DEPT VISIT MOD MDM: CPT | Performed by: EMERGENCY MEDICINE

## 2020-02-03 RX ORDER — BENZONATATE 100 MG/1
100 CAPSULE ORAL ONCE
Status: COMPLETED | OUTPATIENT
Start: 2020-02-03 | End: 2020-02-03

## 2020-02-03 RX ORDER — IBUPROFEN 600 MG/1
600 TABLET ORAL ONCE
Status: COMPLETED | OUTPATIENT
Start: 2020-02-03 | End: 2020-02-03

## 2020-02-03 RX ORDER — FLUTICASONE PROPIONATE 50 MCG
1 SPRAY, SUSPENSION (ML) NASAL DAILY
Qty: 16 G | Refills: 0 | Status: SHIPPED | OUTPATIENT
Start: 2020-02-03 | End: 2021-01-21

## 2020-02-03 RX ORDER — AZITHROMYCIN 250 MG/1
TABLET, FILM COATED ORAL
Qty: 6 TABLET | Refills: 0 | Status: SHIPPED | OUTPATIENT
Start: 2020-02-03 | End: 2020-02-07

## 2020-02-03 RX ORDER — ALBUTEROL SULFATE 90 UG/1
2 AEROSOL, METERED RESPIRATORY (INHALATION) EVERY 4 HOURS PRN
Qty: 1 INHALER | Refills: 0 | Status: SHIPPED | OUTPATIENT
Start: 2020-02-03 | End: 2021-01-21

## 2020-02-03 RX ORDER — GUAIFENESIN 600 MG
1200 TABLET, EXTENDED RELEASE 12 HR ORAL EVERY 12 HOURS SCHEDULED
Qty: 30 TABLET | Refills: 0 | Status: SHIPPED | OUTPATIENT
Start: 2020-02-03 | End: 2021-01-21

## 2020-02-03 RX ORDER — IPRATROPIUM BROMIDE AND ALBUTEROL SULFATE 2.5; .5 MG/3ML; MG/3ML
3 SOLUTION RESPIRATORY (INHALATION) ONCE
Status: COMPLETED | OUTPATIENT
Start: 2020-02-03 | End: 2020-02-03

## 2020-02-03 RX ORDER — BENZONATATE 100 MG/1
100 CAPSULE ORAL EVERY 8 HOURS
Qty: 21 CAPSULE | Refills: 0 | Status: SHIPPED | OUTPATIENT
Start: 2020-02-03 | End: 2021-01-21

## 2020-02-03 RX ORDER — IBUPROFEN 600 MG/1
600 TABLET ORAL EVERY 6 HOURS PRN
Qty: 30 TABLET | Refills: 0 | Status: SHIPPED | OUTPATIENT
Start: 2020-02-03 | End: 2021-01-21

## 2020-02-03 RX ADMIN — IBUPROFEN 600 MG: 600 TABLET ORAL at 17:17

## 2020-02-03 RX ADMIN — IPRATROPIUM BROMIDE AND ALBUTEROL SULFATE 3 ML: 2.5; .5 SOLUTION RESPIRATORY (INHALATION) at 17:17

## 2020-02-03 RX ADMIN — BENZONATATE 100 MG: 100 CAPSULE ORAL at 17:17

## 2020-02-03 NOTE — ED PROVIDER NOTES
History  Chief Complaint   Patient presents with    Cough     Pt reports body aches, dry cough x6-7 days, subjective fevers, headache     66-year-old gentleman presents with complaint of URI type symptoms  He complains of runny nose, mild sore throat, and a dry cough over the past several days  He reports he has had subjective fevers and has taken no medications for symptoms because he does not have any money to purchase them over-the-counter  He is currently residing at the [facility] [place] and has therefore been exposed to multiple sick contacts  He states that he smokes one cigarette a day and denies any significant past medical history  Cough   Cough characteristics:  Non-productive  Severity:  Moderate  Onset quality:  Gradual  Timing:  Constant  Progression:  Waxing and waning  Chronicity:  New  Smoker: yes    Relieved by:  Nothing  Worsened by:  Nothing  Ineffective treatments:  None tried  Associated symptoms: myalgias and rhinorrhea    Associated symptoms: no chest pain, no chills, no diaphoresis, no ear fullness, no ear pain, no eye discharge, no fever, no headaches, no rash, no shortness of breath, no sinus congestion, no sore throat, no weight loss and no wheezing        None       Past Medical History:   Diagnosis Date    Psychiatric illness     [facility]        History reviewed  No pertinent surgical history  History reviewed  No pertinent family history  I have reviewed and agree with the history as documented  Social History     Tobacco Use    Smoking status: Light Tobacco Smoker     Packs/day: 0 20    Smokeless tobacco: Current User   Substance Use Topics    Alcohol use: Not Currently     Comment: socially    Drug use: No        Review of Systems   Constitutional: Negative for activity change, chills, diaphoresis, fatigue, fever and weight loss  HENT: Positive for postnasal drip and rhinorrhea   Negative for congestion, ear pain, sinus pain, sore throat and trouble swallowing  Eyes: Negative  Negative for discharge  Respiratory: Positive for cough  Negative for shortness of breath and wheezing  Cardiovascular: Negative for chest pain  Gastrointestinal: Negative for abdominal pain, constipation, diarrhea, nausea and vomiting  Endocrine: Negative  Genitourinary: Negative  Musculoskeletal: Positive for myalgias  Negative for arthralgias and back pain  Skin: Negative  Negative for rash  Allergic/Immunologic: Negative  Neurological: Negative  Negative for headaches  Hematological: Negative  Psychiatric/Behavioral: Negative  Physical Exam  Physical Exam   Constitutional: He is oriented to person, place, and time  He appears well-developed and well-nourished  No distress  HENT:   Head: Normocephalic and atraumatic  Right Ear: Tympanic membrane normal    Left Ear: Tympanic membrane normal    Nose: Mucosal edema and rhinorrhea present  Mouth/Throat: Uvula is midline and mucous membranes are normal  Posterior oropharyngeal erythema (mild) present  No oropharyngeal exudate, posterior oropharyngeal edema or tonsillar abscesses  Eyes: Pupils are equal, round, and reactive to light  Conjunctivae are normal    Neck: Neck supple  Cardiovascular: Normal rate, regular rhythm and normal heart sounds  Pulmonary/Chest: Effort normal and breath sounds normal  No respiratory distress  He has no wheezes  He has no rales  Abdominal: Soft  Bowel sounds are normal  He exhibits no distension  There is no tenderness  There is no guarding  Musculoskeletal: Normal range of motion  He exhibits no edema  Neurological: He is alert and oriented to person, place, and time  Skin: Skin is warm and dry  Capillary refill takes less than 2 seconds  He is not diaphoretic  Psychiatric: He has a normal mood and affect  His behavior is normal    Nursing note and vitals reviewed        Vital Signs  ED Triage Vitals [02/03/20 1633]   Temperature Pulse Respirations Blood Pressure SpO2   99 6 °F (37 6 °C) 82 20 107/69 97 %      Temp Source Heart Rate Source Patient Position - Orthostatic VS BP Location FiO2 (%)   Tympanic Monitor Sitting Left arm --      Pain Score       7           Vitals:    02/03/20 1633 02/03/20 1808   BP: 107/69 117/75   Pulse: 82    Patient Position - Orthostatic VS: Sitting Sitting         Visual Acuity      ED Medications  Medications   ipratropium-albuterol (DUO-NEB) 0 5-2 5 mg/3 mL inhalation solution 3 mL (3 mL Nebulization Given 2/3/20 1717)   benzonatate (TESSALON PERLES) capsule 100 mg (100 mg Oral Given 2/3/20 1717)   ibuprofen (MOTRIN) tablet 600 mg (600 mg Oral Given 2/3/20 1717)       Diagnostic Studies  Results Reviewed     None                 XR chest 2 views    (Results Pending)              Procedures  Procedures         ED Course                               MDM      Disposition  Final diagnoses:   Cough   URI (upper respiratory infection)     Time reflects when diagnosis was documented in both MDM as applicable and the Disposition within this note     Time User Action Codes Description Comment    2/3/2020  6:00 PM Yuni Alex Add [R05] Cough     2/3/2020  6:00 PM Yuni Bowers Add [J06 9] URI (upper respiratory infection)       ED Disposition     ED Disposition Condition Date/Time Comment    Discharge Stable Mon Feb 3, 2020  6:00 PM Jaz Hernadez discharge to home/self care              Follow-up Information    None         Discharge Medication List as of 2/3/2020  6:01 PM      START taking these medications    Details   albuterol (PROVENTIL HFA,VENTOLIN HFA) 90 mcg/act inhaler Inhale 2 puffs every 4 (four) hours as needed for wheezing, Starting Mon 2/3/2020, Print      azithromycin (ZITHROMAX) 250 mg tablet Take 2 tablets today then 1 tablet daily x 4 days, Print      benzonatate (TESSALON PERLES) 100 mg capsule Take 1 capsule (100 mg total) by mouth every 8 (eight) hours, Starting Mon 2/3/2020, Print      fluticasone (FLONASE) 50 mcg/act nasal spray 1 spray into each nostril daily, Starting Mon 2/3/2020, Print      guaiFENesin (MUCINEX) 600 mg 12 hr tablet Take 2 tablets (1,200 mg total) by mouth every 12 (twelve) hours, Starting Mon 2/3/2020, Print      ibuprofen (MOTRIN) 600 mg tablet Take 1 tablet (600 mg total) by mouth every 6 (six) hours as needed for mild pain, Starting Mon 2/3/2020, Print           No discharge procedures on file      ED Provider  Electronically Signed by           Donavon Molina DO  02/03/20 8660

## 2021-01-21 ENCOUNTER — HOSPITAL ENCOUNTER (EMERGENCY)
Facility: HOSPITAL | Age: 56
End: 2021-01-22
Attending: EMERGENCY MEDICINE | Admitting: EMERGENCY MEDICINE
Payer: COMMERCIAL

## 2021-01-21 DIAGNOSIS — Z00.8 MEDICAL CLEARANCE FOR PSYCHIATRIC ADMISSION: Primary | ICD-10-CM

## 2021-01-21 DIAGNOSIS — F22 PARANOID DELUSION (HCC): ICD-10-CM

## 2021-01-21 DIAGNOSIS — F23 ACUTE PSYCHOSIS (HCC): ICD-10-CM

## 2021-01-21 PROCEDURE — 99285 EMERGENCY DEPT VISIT HI MDM: CPT | Performed by: EMERGENCY MEDICINE

## 2021-01-21 PROCEDURE — 96372 THER/PROPH/DIAG INJ SC/IM: CPT

## 2021-01-21 PROCEDURE — 99285 EMERGENCY DEPT VISIT HI MDM: CPT

## 2021-01-21 RX ORDER — ZIPRASIDONE MESYLATE 20 MG/ML
15 INJECTION, POWDER, LYOPHILIZED, FOR SOLUTION INTRAMUSCULAR ONCE
Status: COMPLETED | OUTPATIENT
Start: 2021-01-21 | End: 2021-01-21

## 2021-01-21 RX ORDER — PANTOPRAZOLE SODIUM 20 MG/1
20 TABLET, DELAYED RELEASE ORAL DAILY
Status: ON HOLD | COMMUNITY
Start: 2020-10-02 | End: 2021-02-11 | Stop reason: SDUPTHER

## 2021-01-21 RX ADMIN — ZIPRASIDONE MESYLATE 15 MG: 20 INJECTION, POWDER, LYOPHILIZED, FOR SOLUTION INTRAMUSCULAR at 23:22

## 2021-01-22 ENCOUNTER — HOSPITAL ENCOUNTER (INPATIENT)
Facility: HOSPITAL | Age: 56
LOS: 10 days | Discharge: PSYCHIATRIC OR SUBSTANCE USE FACILITY | DRG: 750 | End: 2021-02-01
Attending: PSYCHIATRY & NEUROLOGY | Admitting: PSYCHIATRY & NEUROLOGY
Payer: COMMERCIAL

## 2021-01-22 VITALS
RESPIRATION RATE: 19 BRPM | OXYGEN SATURATION: 98 % | SYSTOLIC BLOOD PRESSURE: 130 MMHG | WEIGHT: 170 LBS | HEART RATE: 90 BPM | BODY MASS INDEX: 25.76 KG/M2 | HEIGHT: 68 IN | TEMPERATURE: 98 F | DIASTOLIC BLOOD PRESSURE: 90 MMHG

## 2021-01-22 DIAGNOSIS — Z00.8 MEDICAL CLEARANCE FOR PSYCHIATRIC ADMISSION: ICD-10-CM

## 2021-01-22 LAB
ALBUMIN SERPL BCP-MCNC: 3.4 G/DL (ref 3.5–5)
ALP SERPL-CCNC: 64 U/L (ref 46–116)
ALT SERPL W P-5'-P-CCNC: 28 U/L (ref 12–78)
AMPHETAMINES SERPL QL SCN: POSITIVE
ANION GAP SERPL CALCULATED.3IONS-SCNC: 5 MMOL/L (ref 4–13)
AST SERPL W P-5'-P-CCNC: 18 U/L (ref 5–45)
BACTERIA UR QL AUTO: ABNORMAL /HPF
BARBITURATES UR QL: NEGATIVE
BASOPHILS # BLD AUTO: 0.06 THOUSANDS/ΜL (ref 0–0.1)
BASOPHILS NFR BLD AUTO: 1 % (ref 0–1)
BENZODIAZ UR QL: NEGATIVE
BILIRUB SERPL-MCNC: 0.54 MG/DL (ref 0.2–1)
BILIRUB UR QL STRIP: NEGATIVE
BUN SERPL-MCNC: 11 MG/DL (ref 5–25)
CALCIUM ALBUM COR SERPL-MCNC: 8.9 MG/DL (ref 8.3–10.1)
CALCIUM SERPL-MCNC: 8.4 MG/DL (ref 8.3–10.1)
CHLORIDE SERPL-SCNC: 106 MMOL/L (ref 100–108)
CLARITY UR: CLEAR
CO2 SERPL-SCNC: 28 MMOL/L (ref 21–32)
COCAINE UR QL: NEGATIVE
COLOR UR: YELLOW
CREAT SERPL-MCNC: 0.95 MG/DL (ref 0.6–1.3)
EOSINOPHIL # BLD AUTO: 0.3 THOUSAND/ΜL (ref 0–0.61)
EOSINOPHIL NFR BLD AUTO: 4 % (ref 0–6)
ERYTHROCYTE [DISTWIDTH] IN BLOOD BY AUTOMATED COUNT: 12.9 % (ref 11.6–15.1)
ETHANOL SERPL-MCNC: <3 MG/DL (ref 0–3)
FLUAV RNA RESP QL NAA+PROBE: NEGATIVE
FLUBV RNA RESP QL NAA+PROBE: NEGATIVE
GFR SERPL CREATININE-BSD FRML MDRD: 90 ML/MIN/1.73SQ M
GLUCOSE SERPL-MCNC: 91 MG/DL (ref 65–140)
GLUCOSE UR STRIP-MCNC: NEGATIVE MG/DL
HCT VFR BLD AUTO: 45.2 % (ref 36.5–49.3)
HGB BLD-MCNC: 14.8 G/DL (ref 12–17)
HGB UR QL STRIP.AUTO: NEGATIVE
IMM GRANULOCYTES # BLD AUTO: 0.02 THOUSAND/UL (ref 0–0.2)
IMM GRANULOCYTES NFR BLD AUTO: 0 % (ref 0–2)
KETONES UR STRIP-MCNC: NEGATIVE MG/DL
LEUKOCYTE ESTERASE UR QL STRIP: NEGATIVE
LYMPHOCYTES # BLD AUTO: 2.11 THOUSANDS/ΜL (ref 0.6–4.47)
LYMPHOCYTES NFR BLD AUTO: 29 % (ref 14–44)
MCH RBC QN AUTO: 27.3 PG (ref 26.8–34.3)
MCHC RBC AUTO-ENTMCNC: 32.7 G/DL (ref 31.4–37.4)
MCV RBC AUTO: 83 FL (ref 82–98)
METHADONE UR QL: NEGATIVE
MONOCYTES # BLD AUTO: 0.57 THOUSAND/ΜL (ref 0.17–1.22)
MONOCYTES NFR BLD AUTO: 8 % (ref 4–12)
MUCOUS THREADS UR QL AUTO: ABNORMAL
NEUTROPHILS # BLD AUTO: 4.15 THOUSANDS/ΜL (ref 1.85–7.62)
NEUTS SEG NFR BLD AUTO: 58 % (ref 43–75)
NITRITE UR QL STRIP: POSITIVE
NON-SQ EPI CELLS URNS QL MICRO: ABNORMAL /HPF
NRBC BLD AUTO-RTO: 0 /100 WBCS
OPIATES UR QL SCN: NEGATIVE
OXYCODONE+OXYMORPHONE UR QL SCN: NEGATIVE
PCP UR QL: NEGATIVE
PH UR STRIP.AUTO: 6 [PH]
PLATELET # BLD AUTO: 235 THOUSANDS/UL (ref 149–390)
PMV BLD AUTO: 9.2 FL (ref 8.9–12.7)
POTASSIUM SERPL-SCNC: 4 MMOL/L (ref 3.5–5.3)
PROT SERPL-MCNC: 6.4 G/DL (ref 6.4–8.2)
PROT UR STRIP-MCNC: NEGATIVE MG/DL
RBC # BLD AUTO: 5.43 MILLION/UL (ref 3.88–5.62)
RBC #/AREA URNS AUTO: ABNORMAL /HPF
RSV RNA RESP QL NAA+PROBE: NEGATIVE
SARS-COV-2 RNA RESP QL NAA+PROBE: NEGATIVE
SODIUM SERPL-SCNC: 139 MMOL/L (ref 136–145)
SP GR UR STRIP.AUTO: 1.02 (ref 1–1.03)
THC UR QL: NEGATIVE
TSH SERPL DL<=0.05 MIU/L-ACNC: 2.24 UIU/ML (ref 0.36–3.74)
UROBILINOGEN UR QL STRIP.AUTO: 0.2 E.U./DL
WBC # BLD AUTO: 7.21 THOUSAND/UL (ref 4.31–10.16)
WBC #/AREA URNS AUTO: ABNORMAL /HPF

## 2021-01-22 PROCEDURE — 85025 COMPLETE CBC W/AUTO DIFF WBC: CPT | Performed by: EMERGENCY MEDICINE

## 2021-01-22 PROCEDURE — 36415 COLL VENOUS BLD VENIPUNCTURE: CPT | Performed by: EMERGENCY MEDICINE

## 2021-01-22 PROCEDURE — 80307 DRUG TEST PRSMV CHEM ANLYZR: CPT | Performed by: EMERGENCY MEDICINE

## 2021-01-22 PROCEDURE — 81001 URINALYSIS AUTO W/SCOPE: CPT | Performed by: EMERGENCY MEDICINE

## 2021-01-22 PROCEDURE — 82077 ASSAY SPEC XCP UR&BREATH IA: CPT | Performed by: EMERGENCY MEDICINE

## 2021-01-22 PROCEDURE — 84443 ASSAY THYROID STIM HORMONE: CPT | Performed by: EMERGENCY MEDICINE

## 2021-01-22 PROCEDURE — 80053 COMPREHEN METABOLIC PANEL: CPT | Performed by: EMERGENCY MEDICINE

## 2021-01-22 PROCEDURE — 0241U HB NFCT DS VIR RESP RNA 4 TRGT: CPT | Performed by: EMERGENCY MEDICINE

## 2021-01-22 PROCEDURE — 96372 THER/PROPH/DIAG INJ SC/IM: CPT

## 2021-01-22 RX ORDER — DIPHENHYDRAMINE HYDROCHLORIDE 50 MG/ML
50 INJECTION INTRAMUSCULAR; INTRAVENOUS ONCE
Status: COMPLETED | OUTPATIENT
Start: 2021-01-22 | End: 2021-01-22

## 2021-01-22 RX ORDER — OLANZAPINE 2.5 MG/1
7.5 TABLET ORAL
Status: CANCELLED | OUTPATIENT
Start: 2021-01-22

## 2021-01-22 RX ORDER — MAGNESIUM HYDROXIDE/ALUMINUM HYDROXICE/SIMETHICONE 120; 1200; 1200 MG/30ML; MG/30ML; MG/30ML
30 SUSPENSION ORAL EVERY 4 HOURS PRN
Status: CANCELLED | OUTPATIENT
Start: 2021-01-22

## 2021-01-22 RX ORDER — ACETAMINOPHEN 325 MG/1
975 TABLET ORAL EVERY 6 HOURS PRN
Status: CANCELLED | OUTPATIENT
Start: 2021-01-22

## 2021-01-22 RX ORDER — OLANZAPINE 10 MG/1
10 INJECTION, POWDER, LYOPHILIZED, FOR SOLUTION INTRAMUSCULAR
Status: CANCELLED | OUTPATIENT
Start: 2021-01-22

## 2021-01-22 RX ORDER — HYDROXYZINE HYDROCHLORIDE 25 MG/1
25 TABLET, FILM COATED ORAL EVERY 4 HOURS PRN
Status: CANCELLED | OUTPATIENT
Start: 2021-01-22

## 2021-01-22 RX ORDER — DIPHENHYDRAMINE HYDROCHLORIDE 50 MG/ML
50 INJECTION INTRAMUSCULAR; INTRAVENOUS ONCE
Status: DISCONTINUED | OUTPATIENT
Start: 2021-01-22 | End: 2021-01-22

## 2021-01-22 RX ORDER — NICOTINE 21 MG/24HR
1 PATCH, TRANSDERMAL 24 HOURS TRANSDERMAL DAILY
Status: CANCELLED | OUTPATIENT
Start: 2021-01-22

## 2021-01-22 RX ORDER — OLANZAPINE 2.5 MG/1
5 TABLET ORAL
Status: CANCELLED | OUTPATIENT
Start: 2021-01-22

## 2021-01-22 RX ORDER — TRAZODONE HYDROCHLORIDE 50 MG/1
50 TABLET ORAL
Status: CANCELLED | OUTPATIENT
Start: 2021-01-22

## 2021-01-22 RX ORDER — BENZTROPINE MESYLATE 1 MG/ML
1 INJECTION INTRAMUSCULAR; INTRAVENOUS
Status: CANCELLED | OUTPATIENT
Start: 2021-01-22

## 2021-01-22 RX ORDER — ZIPRASIDONE MESYLATE 20 MG/ML
5 INJECTION, POWDER, LYOPHILIZED, FOR SOLUTION INTRAMUSCULAR ONCE
Status: COMPLETED | OUTPATIENT
Start: 2021-01-22 | End: 2021-01-22

## 2021-01-22 RX ORDER — BENZTROPINE MESYLATE 0.5 MG/1
1 TABLET ORAL
Status: CANCELLED | OUTPATIENT
Start: 2021-01-22

## 2021-01-22 RX ORDER — LORAZEPAM 2 MG/ML
2 INJECTION INTRAMUSCULAR EVERY 4 HOURS PRN
Status: CANCELLED | OUTPATIENT
Start: 2021-01-22

## 2021-01-22 RX ORDER — LORAZEPAM 2 MG/ML
1 INJECTION INTRAMUSCULAR ONCE
Status: COMPLETED | OUTPATIENT
Start: 2021-01-22 | End: 2021-01-22

## 2021-01-22 RX ORDER — ZIPRASIDONE MESYLATE 20 MG/ML
10 INJECTION, POWDER, LYOPHILIZED, FOR SOLUTION INTRAMUSCULAR ONCE
Status: COMPLETED | OUTPATIENT
Start: 2021-01-22 | End: 2021-01-22

## 2021-01-22 RX ORDER — ACETAMINOPHEN 325 MG/1
650 TABLET ORAL EVERY 4 HOURS PRN
Status: CANCELLED | OUTPATIENT
Start: 2021-01-22

## 2021-01-22 RX ORDER — OLANZAPINE 10 MG/1
10 TABLET ORAL
Status: CANCELLED | OUTPATIENT
Start: 2021-01-22

## 2021-01-22 RX ORDER — ACETAMINOPHEN 325 MG/1
650 TABLET ORAL EVERY 6 HOURS PRN
Status: CANCELLED | OUTPATIENT
Start: 2021-01-22

## 2021-01-22 RX ORDER — LORAZEPAM 1 MG/1
1 TABLET ORAL EVERY 4 HOURS PRN
Status: CANCELLED | OUTPATIENT
Start: 2021-01-22

## 2021-01-22 RX ADMIN — ZIPRASIDONE MESYLATE 10 MG: 20 INJECTION, POWDER, LYOPHILIZED, FOR SOLUTION INTRAMUSCULAR at 18:47

## 2021-01-22 RX ADMIN — LORAZEPAM 1 MG: 2 INJECTION INTRAMUSCULAR; INTRAVENOUS at 01:59

## 2021-01-22 RX ADMIN — ZIPRASIDONE MESYLATE 5 MG: 20 INJECTION, POWDER, LYOPHILIZED, FOR SOLUTION INTRAMUSCULAR at 00:54

## 2021-01-22 RX ADMIN — DIPHENHYDRAMINE HYDROCHLORIDE 50 MG: 50 INJECTION, SOLUTION INTRAMUSCULAR; INTRAVENOUS at 01:59

## 2021-01-22 NOTE — ED NOTES
Patient approached this RN holding his copy of the 302   He stated he is a patient at [facility] [address] and his doctors will tell us he is a "perfectly normal kid" and if [facility] makes him do this "I am going to kill myself right after "     Carrol Lindsay, KRISTIN  01/22/21 8436

## 2021-01-22 NOTE — ED NOTES
Crisis approached patient to serve and read 302 rights with security present  Patient is agitated and manic  He remains seated, but continuously talks loud over Crisis as rights are read  Crisis proceeded, completely reading the 302 rights, but patient did not appear to hear anything said, as he rambling non-stop, talking over Crisis attempts  He insists he is leaving and cannot be held here  He does not appear to understand his rights as he argues and demonstrates no insight into the details pertaining to rights or the 302 process  He continues to repeat the name Jj Benoit and insist no one can force him to come here and he is calling his  and he is going to matthew  Dr Winifred Lemon to evaluate the patient

## 2021-01-22 NOTE — ED NOTES
Patient approached this RN again requesting that his doctors be contacted and stating that he is "100% healthy, and a very good amateur golfer" he states the  Only thing wrong with him is that he wants love from a woman and if he doesn't get it he is going to kill himself "     Sophie Rhodes RN  01/22/21 2907

## 2021-01-22 NOTE — ED NOTES
Insurance Authorization for admission:   Phone call placed to [facility]; patient needs to update his address with welfare office as current address is [facility])  Phone number: [phone]  Spoke to LIZZY Mcneal  3 days approved  Level of care: inpatient mental health 302  Review on: call on arrival    Authorization #: accepting facility to call on arrival for authorization number  Insurance Authorization for Transportation:    Phone call placed to [employer]  Phone number [phone]  Spoke to: Samm Floyd  Authorization #: [id number]  (Details provided to YAIMA)

## 2021-01-22 NOTE — ED NOTES
Patient pacing in common area, coming to door stating he has people coming to pick him up       Tata Jama RN  01/22/21 8308

## 2021-01-22 NOTE — ED NOTES
Patient observed in room talking to himself, but appears to be speaking to the empty chair in the room, he is rocking back and forth and shaking his fist      Zan Palomino RN  01/22/21 7945

## 2021-01-22 NOTE — ED PROVIDER NOTES
History  Chief Complaint   Patient presents with    Psychiatric Evaluation     Patient was brought in by EMS and police  Pt states he has a transmitter in his head and there are "bad " trying to control him  Pt has a significant psych history  HPI     Medically clear for psychiatric admission  Patient is a 49-year-old male that reports to the emergency department after in going to the police station with extreme paranoid delusions regarding the police trying to control him and put transmitters into his head  I am quite concerned that the patient is not able to care for himself and has absolutely no insight into his condition  He is constantly going on about people that are out to get him and even responding that he wants to kill some people  I am unsure as to exactly what he is talking about  He has a a lengthy manifest that he brought with him written by hand  Other than the delusions, the patient is quite pleasant, interactive, no acute distress  No fevers, chills, sweats, chest pain, shortness of breaths  Medical decision makin-year-old male, will 36 for extreme psychiatric delusions, possible homicidality  Prior to Admission Medications   Prescriptions Last Dose Informant Patient Reported? Taking? pantoprazole (PROTONIX) 20 mg tablet Not Taking at Unknown time  Yes No   Sig: Take 20 mg by mouth daily      Facility-Administered Medications: None       Past Medical History:   Diagnosis Date    Psychiatric illness     Psychosis ([facility] )        No past surgical history on file  No family history on file  I have reviewed and agree with the history as documented      E-Cigarette/Vaping     E-Cigarette/Vaping Substances     Social History     Tobacco Use    Smoking status: Light Tobacco Smoker     Packs/day: 0 20    Smokeless tobacco: Current User   Substance Use Topics    Alcohol use: Not Currently     Comment: socially    Drug use: No       Review of Systems Psychiatric/Behavioral: Positive for agitation and hallucinations  The patient is nervous/anxious  All other systems reviewed and are negative  Physical Exam  Physical Exam  Vitals signs and nursing note reviewed  Constitutional:       Appearance: He is well-developed  He is not diaphoretic  HENT:      Head: Normocephalic and atraumatic  Right Ear: External ear normal       Left Ear: External ear normal       Nose: No congestion  Eyes:      General:         Right eye: No discharge  Left eye: No discharge  Extraocular Movements: Extraocular movements intact  Neck:      Musculoskeletal: Normal range of motion and neck supple  Cardiovascular:      Rate and Rhythm: Normal rate and regular rhythm  Heart sounds: Normal heart sounds  No murmur  Pulmonary:      Effort: Pulmonary effort is normal  No respiratory distress  Breath sounds: Normal breath sounds  No wheezing  Abdominal:      General: There is no distension  Palpations: Abdomen is soft  Tenderness: There is no abdominal tenderness  Musculoskeletal:         General: No tenderness or signs of injury  Skin:     General: Skin is warm and dry  Findings: No erythema  Neurological:      General: No focal deficit present  Mental Status: He is alert and oriented to person, place, and time  Motor: No weakness  Psychiatric:      Comments: Pressured speech, anxious, paranoid           Vital Signs  ED Triage Vitals   Temperature Pulse Respirations Blood Pressure SpO2   01/21/21 2155 01/21/21 2155 01/21/21 2155 01/21/21 2155 01/21/21 2155   98 1 °F (36 7 °C) 99 18 170/89 98 %      Temp Source Heart Rate Source Patient Position - Orthostatic VS BP Location FiO2 (%)   01/21/21 2155 01/21/21 2155 01/21/21 2155 01/21/21 2155 --   Oral Monitor Sitting Left arm       Pain Score       01/22/21 0145       No Pain           Vitals:    01/21/21 2155 01/22/21 0145 01/22/21 0348   BP: 170/89 135/91 128/82 Pulse: 99 103 82   Patient Position - Orthostatic VS: Sitting Lying Lying         Visual Acuity      ED Medications  Medications   ziprasidone (GEODON) IM injection 15 mg (15 mg Intramuscular Given 1/21/21 4832)   ziprasidone (GEODON) IM injection 5 mg (5 mg Intramuscular Given 1/22/21 0054)   LORazepam (ATIVAN) injection 1 mg (1 mg Intramuscular Given 1/22/21 0159)   diphenhydrAMINE (BENADRYL) injection 50 mg (50 mg Intramuscular Given 1/22/21 0159)       Diagnostic Studies  Results Reviewed     Procedure Component Value Units Date/Time    Urine Microscopic [[id number]]  (Abnormal) Collected: 01/22/21 0648    Lab Status: Final result Specimen: Urine Updated: 01/22/21 0649     RBC, UA 0-1 /hpf      WBC, UA 2-4 /hpf      Epithelial Cells Occasional /hpf      Bacteria, UA None Seen /hpf      MUCUS THREADS Moderate    Rapid drug screen, urine [[id number]]  (Abnormal) Collected: 01/22/21 0635    Lab Status: Final result Specimen: Urine Updated: 01/22/21 0649     Amph/Meth UR Positive     Barbiturate Ur Negative     Benzodiazepine Urine Negative     Cocaine Urine Negative     Methadone Urine Negative     Opiate Urine Negative     PCP Ur Negative     THC Urine Negative     Oxycodone Urine Negative    Narrative:      FOR MEDICAL PURPOSES ONLY  IF CONFIRMATION NEEDED PLEASE CONTACT THE LAB WITHIN 5 DAYS      Drug Screen Cutoff Levels:  AMPHETAMINE/METHAMPHETAMINES  1000 ng/mL  BARBITURATES     200 ng/mL  BENZODIAZEPINES     200 ng/mL  COCAINE      300 ng/mL  METHADONE      300 ng/mL  OPIATES      300 ng/mL  PHENCYCLIDINE     25 ng/mL  THC       50 ng/mL  OXYCODONE      100 ng/mL    UA w Reflex to Microscopic w Reflex to Culture [[id number]]  (Abnormal) Collected: 01/22/21 0648    Lab Status: Final result Specimen: Urine Updated: 01/22/21 0648     Color, UA Yellow     Clarity, UA Clear     Specific Gravity, UA 1 025     pH, UA 6 0     Leukocytes, UA Negative     Nitrite, UA Positive     Protein, UA Negative mg/dl Glucose, UA Negative mg/dl      Ketones, UA Negative mg/dl      Urobilinogen, UA 0 2 E U /dl      Bilirubin, UA Negative     Blood, UA Negative    COVID19, Influenza A/B, RSV PCR, [facility] [[id number]]  (Normal) Collected: 01/22/21 0347    Lab Status: Final result Specimen: Nares from Nasopharyngeal Swab Updated: 01/22/21 0433     SARS-CoV-2 Negative     INFLUENZA A PCR Negative     INFLUENZA B PCR Negative     RSV PCR Negative    Narrative: This test has been authorized by FDA under an EUA (Emergency Use Assay) for use by authorized laboratories  Clinical caution and judgement should be used with the interpretation of these results with consideration of the clinical impression and other laboratory testing  Testing reported as "Positive" or "Negative" has been proven to be accurate according to standard laboratory validation requirements  All testing is performed with control materials showing appropriate reactivity at standard intervals  TSH, 3rd generation with Free T4 reflex [[id number]]  (Normal) Collected: 01/22/21 0347    Lab Status: Final result Specimen: Blood from Arm, Right Updated: 01/22/21 0419     TSH 3RD GENERATON 2 239 uIU/mL     Narrative:      Patients undergoing fluorescein dye angiography may retain small amounts of fluorescein in the body for 48-72 hours post procedure  Samples containing fluorescein can produce falsely depressed TSH values  If the patient had this procedure,a specimen should be resubmitted post fluorescein clearance        Comprehensive metabolic panel [[id number]]  (Abnormal) Collected: 01/22/21 0347    Lab Status: Final result Specimen: Blood from Arm, Right Updated: 01/22/21 0413     Sodium 139 mmol/L      Potassium 4 0 mmol/L      Chloride 106 mmol/L      CO2 28 mmol/L      ANION GAP 5 mmol/L      BUN 11 mg/dL      Creatinine 0 95 mg/dL      Glucose 91 mg/dL      Calcium 8 4 mg/dL      Corrected Calcium 8 9 mg/dL      AST 18 U/L      ALT 28 U/L      Alkaline Phosphatase 64 U/L      Total Protein 6 4 g/dL      Albumin 3 4 g/dL      Total Bilirubin 0 54 mg/dL      eGFR 90 ml/min/1 73sq m     Narrative:      National Kidney Disease Foundation guidelines for Chronic Kidney Disease (CKD):     Stage 1 with normal or high GFR (GFR > 90 mL/min/1 73 square meters)    Stage 2 Mild CKD (GFR = 60-89 mL/min/1 73 square meters)    Stage 3A Moderate CKD (GFR = 45-59 mL/min/1 73 square meters)    Stage 3B Moderate CKD (GFR = 30-44 mL/min/1 73 square meters)    Stage 4 Severe CKD (GFR = 15-29 mL/min/1 73 square meters)    Stage 5 End Stage CKD (GFR <15 mL/min/1 73 square meters)  Note: GFR calculation is accurate only with a steady state creatinine    Ethanol [[id number]]  (Normal) Collected: 01/22/21 0346    Lab Status: Final result Specimen: Blood from Arm, Right Updated: 01/22/21 0408     Ethanol Lvl <3 mg/dL     CBC and differential [[id number]] Collected: 01/22/21 0347    Lab Status: Final result Specimen: Blood from Arm, Right Updated: 01/22/21 0353     WBC 7 21 Thousand/uL      RBC 5 43 Million/uL      Hemoglobin 14 8 g/dL      Hematocrit 45 2 %      MCV 83 fL      MCH 27 3 pg      MCHC 32 7 g/dL      RDW 12 9 %      MPV 9 2 fL      Platelets 547 Thousands/uL      nRBC 0 /100 WBCs      Neutrophils Relative 58 %      Immat GRANS % 0 %      Lymphocytes Relative 29 %      Monocytes Relative 8 %      Eosinophils Relative 4 %      Basophils Relative 1 %      Neutrophils Absolute 4 15 Thousands/µL      Immature Grans Absolute 0 02 Thousand/uL      Lymphocytes Absolute 2 11 Thousands/µL      Monocytes Absolute 0 57 Thousand/µL      Eosinophils Absolute 0 30 Thousand/µL      Basophils Absolute 0 06 Thousands/µL                  No orders to display              Procedures  Procedures         ED Course  ED Course as of Jan 22 0712 Fri Jan 22, 2021   0701 SO - 302'ed by me for HI and extreme delusions risk of hurting himself                                SBIRT 22yo+      Most Recent Value SBIRT (23 yo +)   In order to provide better care to our patients, we are screening all of our patients for alcohol and drug use  Would it be okay to ask you these screening questions? Unable to answer at this time Filed at: 01/21/2021 9522                    MDM    Disposition  Final diagnoses:   None     ED Disposition     None      Follow-up Information    None         Patient's Medications   Discharge Prescriptions    No medications on file     No discharge procedures on file      PDMP Review     None          ED Provider  Electronically Signed by           Debbie Oneill MD  01/22/21 5564       Debbie Oneill MD  01/22/21 4226

## 2021-01-22 NOTE — ED NOTES
Patient is accepted at [facility]  Patient is accepted by   [facility] per Libia Fernandes  Transportation is arranged with [facility]  Transportation is scheduled for 1830 with [facility]  Patient may go to the floor after 1700  Nurse report is to be called to [phone] prior to patient transfer

## 2021-01-22 NOTE — ED NOTES
Tried to obtain med list and pt stated he "never took any medications in his entire life"     [employer], RN  01/21/21 7855

## 2021-01-22 NOTE — ED NOTES
Pt  Currently sleeping, pt  Appears to be in no distress  Will monitor        Peña Hallman RN  01/22/21 8972

## 2021-01-22 NOTE — EMTALA/ACUTE CARE TRANSFER
Gabriel Ferguson [address] [place] [address]  Dept: [phone]      EMTALA TRANSFER CONSENT    NAME Alfredo Kunz                                         1965                              MRN [id number]    I have been informed of my rights regarding examination, treatment, and transfer   by Dr Augusta Lopez MD    Benefits: Specialized equipment and/or services available at the receiving facility (Include comment)________________________, Continuity of care, Other benefits (Include comment)_______________________(inpatient mental health 302)    Risks: Potential for delay in receiving treatment, Potential deterioration of medical condition, Increased discomfort during transfer, Possible worsening of condition or death during transfer      Consent for Transfer:  I acknowledge that my medical condition has been evaluated and explained to me by the emergency department physician or other qualified medical person and/or my attending physician, who has recommended that I be transferred to the service of  Accepting Physician: Dr Cali Lloyd at [address] [place] [address] Name, [address] : [facility]    The above potential benefits of such transfer, the potential risks associated with such transfer, and the probable risks of not being transferred have been explained to me, and I fully understand them  The doctor has explained that, in my case, the benefits of transfer outweigh the risks  I agree to be transferred  I authorize the performance of emergency medical procedures and treatments upon me in both transit and upon arrival at the receiving facility  Additionally, I authorize the release of any and all medical records to the receiving facility and request they be transported with me, if possible    I understand that the safest mode of transportation during a medical emergency is an ambulance and that the [facility] advocates the use of this mode of transport  Risks of traveling to the receiving facility by car, including absence of medical control, life sustaining equipment, such as oxygen, and medical personnel has been explained to me and I fully understand them  (ANTONIO CORRECT BOX BELOW)  [X]  I consent to the stated transfer and to be transported by ambulance/helicopter  [  ]  I consent to the stated transfer, but refuse transportation by ambulance and accept full responsibility for my transportation by car  I understand the risks of non-ambulance transfers and I exonerate the Hospital and its staff from any deterioration in my condition that results from this refusal     X___________________________________________    DATE  21  TIME________  Signature of patient or legally responsible individual signing on patient behalf           RELATIONSHIP TO PATIENT_________________________          Provider Certification    NAME Sharron Francis                                        [facility] 1965                              MRN [id number]    A medical screening exam was performed on the above named patient  Based on the examination:    Condition Necessitating Transfer The primary encounter diagnosis was Medical clearance for psychiatric admission  Diagnoses of Paranoid delusion ([address] ) and Acute psychosis ([address] ) were also pertinent to this visit  Patient Condition: The patient has been stabilized such that within reasonable medical probability, no material deterioration of the patient condition or the condition of the unborn child(paulino) is likely to result from the transfer    Reason for Transfer: Level of Care needed not available at this facility, No bed available at level of patient's needs, Other (Include comment)____________________(inpatient mental health 302)    Transfer Requirements: [address]     · Space available and qualified personnel available for treatment as acknowledged by Froilan Green  · Agreed to accept transfer and to provide appropriate medical treatment as acknowledged by       Dr Jannet Angel  · Appropriate medical records of the examination and treatment of the patient are provided at the time of transfer   [address], [address] _______  · Transfer will be performed by qualified personnel from [facility],  and appropriate transfer equipment as required, including the use of necessary and appropriate life support measures  Provider Certification: I have examined the patient and explained the following risks and benefits of being transferred/refusing transfer to the patient/family:  General risk, such as traffic hazards, adverse weather conditions, rough terrain or turbulence, possible failure of equipment (including vehicle or aircraft), or consequences of actions of persons outside the control of the transport personnel, Unanticipated needs of medical equipment and personnel during transport, Risk of worsening condition, The possibility of a transport vehicle being unavailable, Consent was not obtained as patient is committed to psychiatric facility and transfer is mandated      Based on these reasonable risks and benefits to the patient and/or the unborn child(paulino), and based upon the information available at the time of the patients examination, I certify that the medical benefits reasonably to be expected from the provision of appropriate medical treatments at another medical facility outweigh the increasing risks, if any, to the individuals medical condition, and in the case of labor to the unborn child, from effecting the transfer      X____________________________________________ DATE 01/22/21        TIME_______      ORIGINAL - SEND TO MEDICAL RECORDS   COPY - SEND WITH PATIENT DURING TRANSFER

## 2021-01-22 NOTE — ED NOTES
Patient continues to make phone calls to get someone to pick him up, states we cannot force him to stay       Florentino Carreon RN  01/22/21 4297

## 2021-01-22 NOTE — ED NOTES
Pt approached this RN stating he wants a [place]  contacted to come in  He states he will give them access to his home and all the drug paraphernalia and methamphetamines inside would be theirs in exchange for his freedom  Informed patient that sounds a lot like he is trying to bribe a    Patient stated, "I don't care as long as I can get out of here and find the love of a woman I don't care "     Jamir Livingston, RN  01/22/21 1021

## 2021-01-22 NOTE — ED NOTES
Patient threw phone to the ground after phone call with "Garrett Stallings" to get him out of the hospital  Phone removed from patient room       Emiliano Aldrich RN  01/22/21 5467

## 2021-01-22 NOTE — ED NOTES
Patient presented to the ED via police on 9/17/48 with a 059 initiated by police  Patient was very agitated and uncooperative on arrival and for some time thereafter  He had to be medicated to obtain medical clearance  In the interim, attending physician assessed and based on that and observation of the patient, the 36 petition was upheld  This morning, the patient was assessed formally by Crisis, but information provided is limited and unreliable given his delusional thought process and poor insight  Patient is alert and oriented  He is manic, talkative, loud, pressured and rambling about a variety of things related to conspiracy, a  tormenting him, monitoring him, and trying to force him to join his family  Rambling about needing to go to [facility][1] for a job  Rambling about calling an  and suing everyone  He is able to answer assessment questions between rambling  He denies suicidal ideas, plan, intent and cites future planning and value for his own life  Last night he was heard rambling about killing himself, but he was rather upset about being in the ED and there is no concern for suicidal intent  He denies homicidal ideas, plan, intent and denies violence or aggression to others, but does assume a threatening posture and is intimidating with his agitation, flailing arms, rapid pacing, and loud speech  He does refrain from touching or approaching people and maintains boundaries despite his agitation  He denies feeling depressed or anxious, but clearly he is anxious  Denies drug use, but UDS was positive for meth  He reported to others that meth helps him focus  Denies hallucinations or delusions, but was observed talking to himself and is floridly psychotic with delusions of grandeur and surveillance  He is manic with poor judgement and poor insight  Impulse control is limited    He has journaled on his theories regarding the  and presents this as "evidence" along with bank statements  He appears to be spending money rapidly and reported spending his inheritance  The delusions pertaining to police and a device implanted in his head appear to be long standing and it seems he usually evades admission because there is no 302 criteria despite his delusional beliefs  At this time, he is so psychotically preoccupied he is unable to adequately assure his own care and safety with his erratic behaviors and impaired insight  302 was upheld  This has been explained to him  He argues relentlessly about this  Requested a copy of the 302 and one was provided  Insists he has to go to [place] today and that he needs to call "Eric Syed"

## 2021-01-23 PROBLEM — Z00.8 MEDICAL CLEARANCE FOR PSYCHIATRIC ADMISSION: Status: ACTIVE | Noted: 2021-01-23

## 2021-01-23 PROCEDURE — 99221 1ST HOSP IP/OBS SF/LOW 40: CPT | Performed by: PSYCHIATRY & NEUROLOGY

## 2021-01-23 PROCEDURE — 99253 IP/OBS CNSLTJ NEW/EST LOW 45: CPT | Performed by: NURSE PRACTITIONER

## 2021-01-23 RX ORDER — PANTOPRAZOLE SODIUM 20 MG/1
20 TABLET, DELAYED RELEASE ORAL DAILY
Status: DISCONTINUED | OUTPATIENT
Start: 2021-01-23 | End: 2021-02-01 | Stop reason: HOSPADM

## 2021-01-23 RX ORDER — HYDROXYZINE HYDROCHLORIDE 25 MG/1
25 TABLET, FILM COATED ORAL EVERY 4 HOURS PRN
Status: DISCONTINUED | OUTPATIENT
Start: 2021-01-23 | End: 2021-02-01 | Stop reason: HOSPADM

## 2021-01-23 RX ORDER — QUETIAPINE FUMARATE 100 MG/1
100 TABLET, FILM COATED ORAL
Status: DISCONTINUED | OUTPATIENT
Start: 2021-01-23 | End: 2021-01-26

## 2021-01-23 RX ORDER — OLANZAPINE 5 MG/1
5 TABLET ORAL
Status: DISCONTINUED | OUTPATIENT
Start: 2021-01-23 | End: 2021-02-01 | Stop reason: HOSPADM

## 2021-01-23 RX ORDER — BENZTROPINE MESYLATE 1 MG/1
1 TABLET ORAL
Status: DISCONTINUED | OUTPATIENT
Start: 2021-01-23 | End: 2021-02-01 | Stop reason: HOSPADM

## 2021-01-23 RX ORDER — OLANZAPINE 10 MG/1
10 TABLET ORAL
Status: DISCONTINUED | OUTPATIENT
Start: 2021-01-23 | End: 2021-02-01 | Stop reason: HOSPADM

## 2021-01-23 RX ORDER — BENZTROPINE MESYLATE 1 MG/ML
1 INJECTION INTRAMUSCULAR; INTRAVENOUS
Status: DISCONTINUED | OUTPATIENT
Start: 2021-01-23 | End: 2021-02-01 | Stop reason: HOSPADM

## 2021-01-23 RX ORDER — NICOTINE 21 MG/24HR
1 PATCH, TRANSDERMAL 24 HOURS TRANSDERMAL DAILY PRN
Status: DISCONTINUED | OUTPATIENT
Start: 2021-01-23 | End: 2021-02-01 | Stop reason: HOSPADM

## 2021-01-23 RX ORDER — ACETAMINOPHEN 325 MG/1
650 TABLET ORAL EVERY 6 HOURS PRN
Status: DISCONTINUED | OUTPATIENT
Start: 2021-01-23 | End: 2021-02-01 | Stop reason: HOSPADM

## 2021-01-23 RX ORDER — ACETAMINOPHEN 325 MG/1
975 TABLET ORAL EVERY 6 HOURS PRN
Status: DISCONTINUED | OUTPATIENT
Start: 2021-01-23 | End: 2021-02-01 | Stop reason: HOSPADM

## 2021-01-23 RX ORDER — LORAZEPAM 1 MG/1
1 TABLET ORAL EVERY 4 HOURS PRN
Status: DISCONTINUED | OUTPATIENT
Start: 2021-01-23 | End: 2021-02-01 | Stop reason: HOSPADM

## 2021-01-23 RX ORDER — LORAZEPAM 2 MG/ML
2 INJECTION INTRAMUSCULAR EVERY 4 HOURS PRN
Status: DISCONTINUED | OUTPATIENT
Start: 2021-01-23 | End: 2021-02-01 | Stop reason: HOSPADM

## 2021-01-23 RX ORDER — OLANZAPINE 10 MG/1
10 INJECTION, POWDER, LYOPHILIZED, FOR SOLUTION INTRAMUSCULAR
Status: DISCONTINUED | OUTPATIENT
Start: 2021-01-23 | End: 2021-02-01 | Stop reason: HOSPADM

## 2021-01-23 RX ORDER — TRAZODONE HYDROCHLORIDE 50 MG/1
50 TABLET ORAL
Status: DISCONTINUED | OUTPATIENT
Start: 2021-01-23 | End: 2021-02-01 | Stop reason: HOSPADM

## 2021-01-23 RX ORDER — NICOTINE 21 MG/24HR
1 PATCH, TRANSDERMAL 24 HOURS TRANSDERMAL DAILY
Status: DISCONTINUED | OUTPATIENT
Start: 2021-01-23 | End: 2021-01-23

## 2021-01-23 RX ORDER — ACETAMINOPHEN 325 MG/1
650 TABLET ORAL EVERY 4 HOURS PRN
Status: DISCONTINUED | OUTPATIENT
Start: 2021-01-23 | End: 2021-02-01 | Stop reason: HOSPADM

## 2021-01-23 RX ORDER — MAGNESIUM HYDROXIDE/ALUMINUM HYDROXICE/SIMETHICONE 120; 1200; 1200 MG/30ML; MG/30ML; MG/30ML
30 SUSPENSION ORAL EVERY 4 HOURS PRN
Status: DISCONTINUED | OUTPATIENT
Start: 2021-01-23 | End: 2021-02-01 | Stop reason: HOSPADM

## 2021-01-23 NOTE — PROGRESS NOTES
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

## 2021-01-23 NOTE — H&P
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  PLAN:   1) Seroquel 100 mg PO Q HS for Psychosis and mood  2) Group and individual therapy  3) Discharge planning  Chief Complaint: " I have no doubt that police is involved "    History of Present Illness:  55 CM who was seen  for an initial evaluation  Patient went to police asking for help and saying that  and detectives are after him  Patient is currently extremely manic  He has marked tangentiality and circumstantiality  He told this writer in elaborate story where he said that he is from a well his age family  And the threes brothers was last name is Jenna Mcnair in [place]  police department after him and out trying to frame him  He told this writer that the police department has tried to frame him and tried to make him look like a padded to her when he is not  He also talked about a person who believes has disappeared and Andrew Wooten is trying to cover that up  Staff reports that patient continues to be disorganized and unable to follow directions however he is redirectable  He has reported at the time of interview and admission that he had a implanted in his bed  When this writer talked to him he said that this could be a possibility however he believes that he was possibly thinking like that  His urine drug screen was positive for meth at the time of admission  He also has mild grandiosity he says a lot of positive things about himself  He has elaborate system of disorganized or organized delusions  He denied any hallucinations suicidal homicidal ideas  PAST PSYCH HISTORY:  Patient says that he was 36, 1 time in the past however this was the same scenario and police was trying to frame him    He denied taking any psychotropic medications in the past he denied seeing an outpatient psychiatrist in the past         Substance Abuse History:    Patient is denying any drug or alcohol use urine drug screen was positive for meth however he reported that he used meth two weeks prior to admission  Family Psychiatric History:   Denied any family psychiatric live psychotropic illness  Social History:  Education:  [employer] he has an associate degree  Learning Disabilities:  None  Marital history:  Single but was in a relationship for 18 years  Living arrangement, social support:  Patient lives at friend's house but currently homeless  Occupational History:  Patient reported that unemployed  Functioning Relationships:  Poor support system  Other Pertinent History: None      Traumatic History:   Abuse: None  Other Traumatic Events: None  Med Hx; Review H& P  Past Medical History:   Diagnosis Date    Psychiatric illness     Psychosis [facility])      Medical Review Of Systems:    No current medical complaints    Scheduled Meds:Review meds       Scheduled Meds:  Current Facility-Administered Medications   Medication Dose Route Frequency Provider Last Rate    acetaminophen  650 mg Oral Q6H PRN Marc C Holter, DO      acetaminophen  650 mg Oral Q4H PRN Marc C Holter, DO      acetaminophen  975 mg Oral Q6H PRN Marc C Holter, DO      aluminum-magnesium hydroxide-simethicone  30 mL Oral Q4H PRN Marc C Holter, DO      benztropine  1 mg Intramuscular Q1H PRN [place] C Holter, DO      benztropine  1 mg Oral Q1H PRN [place] C Holter, DO      hydrOXYzine HCL  25 mg Oral Q4H PRN Marc C Holter, DO      LORazepam  2 mg Intramuscular Q4H PRN [place] C Holter, DO      LORazepam  1 mg Oral Q4H PRN Marc C Holter, DO      magnesium hydroxide  30 mL Oral Daily PRN [place] C Holter, DO      nicotine  1 patch Transdermal Daily PRN SHAYNE Valentine      OLANZapine  10 mg Intramuscular Q3H PRN [place] C Holter, DO      OLANZapine  10 mg Oral Q3H PRN Marc C Holter, DO      OLANZapine  5 mg Oral Q3H PRN Marc C Holter, DO      OLANZapine  7 5 mg Oral Q3H PRN [place] C Holter, DO      pantoprazole  20 mg Oral Daily SHAYNE Greene      QUEtiapine  100 mg Oral HS Ti Clark MD      traZODone  50 mg Oral HS PRN [place] C Holter, DO       Continuous Infusions:   PRN Meds:   acetaminophen    acetaminophen    acetaminophen    aluminum-magnesium hydroxide-simethicone    benztropine    benztropine    hydrOXYzine HCL    LORazepam    LORazepam    magnesium hydroxide    nicotine    OLANZapine    OLANZapine    OLANZapine    OLANZapine    traZODone     Allergies   Allergen Reactions    Bee Venom     Penicillins      States he had a reaction as a baby, doesn't know reaction       Vitals:    01/22/21 2053 01/23/21 0742 01/23/21 1100 01/23/21 1546   BP: 118/62  147/96 159/97   BP Location: Left arm  Left arm Left arm   Pulse: 85  79 78   Resp: 18 18 16 16   Temp: 97 9 °F (36 6 °C) 98 3 °F (36 8 °C)  98 °F (36 7 °C)   TempSrc: Temporal Temporal  Temporal   SpO2: 98%      Weight: 71 7 kg (158 lb) 71 9 kg (158 lb 9 6 oz)     Height: 5' 8" (1 727 m)          Mental Status Evaluation:  Patient appeared of his age made good eye contact came to the room voluntarily has normal steady gait  Happy and has a bright affect  He denied any hallucinations  He had elaborate story regarding please cheese and doctors involved in trying to frame him  He has grandiosity  Speech is spontaneous has increased productivity  His thought process has tangentiality and circumstantiality  He talked nonstop  He has grandiosity  He has poor insight and judgment  Recent remote memory intact  Mildly increased psychomotor activity  Lab Results: I have personally reviewed pertinent lab results  NOTE:  Total of 40 minutes were spent in talking to patient completing this medical record reviewing medical chart medical decision making    Ti Clark MD

## 2021-01-23 NOTE — NURSING NOTE
Patient was admitted via [facility] on a 302  He apparently has no previous admissions but he went to the police precinct and told them about the various police and detectives who were stalking/monitoring and following him  - they had implanted a transmitter in his head  They had also planted devices in his car and were tracking his locations and turning his car on and off  He was delusional, paranoid, agitated, loud and was given IM Zyprexa in the ER  He was also given  IM Geodon when he was leaving the er because he was refusing to come here  He arrived here drowsy and no able to participate in admission interview

## 2021-01-23 NOTE — CONSULTS
Consult- Bay Mendez 1965, 54 y o  male MRN: [id number]  Unit/Bed#: [facility] 344-02 Encounter: [id number]  Primary Care Provider: Mayco Zhang MD   Date and time admitted to hospital: 1/22/2021  7:28 PM      Inpatient consult for Medical Clearance for [address] [place] [address] patient  Consult performed by: SHAYNE Forde Asp  Consult ordered by: Brenita Curet Holter,         Medical clearance for psychiatric admission  Assessment & Plan   The patient was evaluated, and is medically clear for admission to the [facility] and for treatment of the underlying psychiatric illness   There are no acute medical needs for the patient at this time  Medicine to sign off at this time   If an acute need develops please call for a consult  ECT Clearance:   History of recent seizure or stroke:  No   History of pheochromocytoma:  No   History of active bleeding (Intracranial hemorrhage, aneurysm or AVM):  No   History of metallic implants in the head or neck:  No   History of increased intracranial pressure with mass effect:  No   Does the patient have a current arrhythmia? No      Based on above criteria, Patient is medically cleared for ECT should it be recommended  Counseling / Coordination of Care Time: 30 minutes  Greater than 50% of total time spent on patient counseling and coordination of care  Collaboration of Care: Were Recommendations Directly Discussed with Primary Treatment Team? - Yes     History of Present Illness:    Bay Mendez is a 54 y o  male who is originally admitted to the psychiatry service due to increasing agitation with paranoid delusions and possible homicidal ideation  Patient believes that he has a transmitter that was implanted into his head and that he is being controlled by bad police  We are consulted for medical clearance for admission to [address] [place] [address] and treatment of underlying psychiatric illness  Patient presents with a PMH that is not significant    Patient is seen on the unit initially patient was in the bathroom having a BM and talking to himself  Patient is preoccupied with a conspiracy theory regarding the [place] police  Review of Systems:    Review of Systems   Unable to perform ROS: Psychiatric disorder       Past Medical and Surgical History:     Past Medical History:   Diagnosis Date    Psychiatric illness     Psychosis ([address] )        No past surgical history on file  Meds/Allergies:    all medications and allergies reviewed    Allergies: Allergies   Allergen Reactions    Bee Venom     Penicillins      States he had a reaction as a baby, doesn't know reaction       Social History:     Marital Status: Single    Substance Use History:   Social History     Substance and Sexual Activity   Alcohol Use Not Currently    Comment: socially     Social History     Tobacco Use   Smoking Status Light Tobacco Smoker    Packs/day: 0 20   Smokeless Tobacco Current User     Social History     Substance and Sexual Activity   Drug Use Yes    Types: Methamphetamines       Family History:    non-contributory    Physical Exam:     Vitals:   Blood Pressure: 118/62 (01/22/21 2053)  Pulse: 85 (01/22/21 2053)  Temperature: 98 3 °F (36 8 °C) (01/23/21 0742)  Temp Source: Temporal (01/23/21 0742)  Respirations: 18 (01/23/21 0742)  Weight - Scale: 71 9 kg (158 lb 9 6 oz) (01/23/21 0742)    Physical Exam  Vitals signs and nursing note reviewed  Constitutional:       General: He is not in acute distress  Appearance: Normal appearance  He is not ill-appearing or diaphoretic  HENT:      Head: Normocephalic and atraumatic  Nose: Nose normal    Cardiovascular:      Rate and Rhythm: Normal rate and regular rhythm  Heart sounds: Normal heart sounds  No murmur  Pulmonary:      Effort: Pulmonary effort is normal  No respiratory distress  Breath sounds: Normal breath sounds  No stridor  No wheezing or rhonchi  Abdominal:      General: Abdomen is flat   Bowel sounds are normal  There is no distension  Palpations: Abdomen is soft  There is no mass  Tenderness: There is no abdominal tenderness  There is no guarding or rebound  Hernia: No hernia is present  Skin:     General: Skin is warm and dry  Coloration: Skin is not jaundiced  Neurological:      General: No focal deficit present  Mental Status: He is alert  Mental status is at baseline  Psychiatric:         Attention and Perception: He is inattentive  He perceives auditory hallucinations  Mood and Affect: Mood is anxious and elated  Affect is angry and inappropriate  Speech: Speech is rapid and pressured and tangential          Behavior: Behavior is hyperactive  Behavior is cooperative  Thought Content: Thought content is paranoid and delusional          Cognition and Memory: Cognition is impaired  Memory is impaired  Judgment: Judgment is impulsive and inappropriate  Additional Data:     Lab Results: I have personally reviewed pertinent reports  Results from last 7 days   Lab Units 01/22/21  0347   WBC Thousand/uL 7 21   HEMOGLOBIN g/dL 14 8   HEMATOCRIT % 45 2   PLATELETS Thousands/uL 235   NEUTROS PCT % 58   LYMPHS PCT % 29   MONOS PCT % 8   EOS PCT % 4     Results from last 7 days   Lab Units 01/22/21  0347   SODIUM mmol/L 139   POTASSIUM mmol/L 4 0   CHLORIDE mmol/L 106   CO2 mmol/L 28   BUN mg/dL 11   CREATININE mg/dL 0 95   ANION GAP mmol/L 5   CALCIUM mg/dL 8 4   ALBUMIN g/dL 3 4*   TOTAL BILIRUBIN mg/dL 0 54   ALK PHOS U/L 64   ALT U/L 28   AST U/L 18   GLUCOSE RANDOM mg/dL 91             No results found for: HGBA1C        EKG, Pathology, and Other Studies Reviewed on Admission:   · None new     ** Please Note: This note has been constructed using a voice recognition system   **

## 2021-01-23 NOTE — ASSESSMENT & PLAN NOTE
 The patient was evaluated, and is medically clear for admission to the [facility] and for treatment of the underlying psychiatric illness   There are no acute medical needs for the patient at this time  Medicine to sign off at this time   If an acute need develops please call for a consult

## 2021-01-23 NOTE — PLAN OF CARE
Problem: DEPRESSION  Goal: Will be euthymic at discharge  Description: INTERVENTIONS:  - Administer medication as ordered  - Provide emotional support via 1:1 interaction with staff  - Encourage involvement in milieu/groups/activities  - Monitor for social isolation  Outcome: Progressing     Problem: SUBSTANCE USE/ABUSE  Goal: Will have no detox symptoms and will verbalize plan for changing substance-related behavior  Description: INTERVENTIONS:  - Monitor physical status and assess for symptoms of withdrawal  - Administer medication as ordered  - Provide emotional support with 1 on 1 interaction with staff  - Encourage recovery focused program/ addiction education  - Assess for verbalization of changing behaviors related to substance abuse  - Initiate consults and referrals as appropriate (Case Management, Spiritual Care, etc )  Outcome: Progressing  Goal: By discharge, patient will have ongoing treatment plan addressing chemical dependency  Description: INTERVENTIONS:  - Assist patient with resources and/or appointments for ongoing recovery based living  Outcome: Progressing     Problem: PSYCHOSIS  Goal: Will report no hallucinations or delusions  Description: Interventions:  - Administer medication as  ordered  - Every waking shifts and PRN assess for the presence of hallucinations and or delusions  - Assist with reality testing to support increasing orientation  - Assess if patient's hallucinations or delusions are encouraging self-harm or harm to others and intervene as appropriate  Outcome: Not Progressing     Problem: SUBSTANCE USE/ABUSE  Goal: By discharge, will develop insight into their chemical dependency and sustain motivation to continue in recovery  Description: INTERVENTIONS:  - Attends all daily group sessions and scheduled AA groups  - Actively practices coping skills through participation in the therapeutic community and adherence to program rules  - Reviews and completes assignments from individual treatment plan  - Assist patient development of understanding of their personal cycle of addiction and relapse triggers  Outcome: Not Progressing     Problem: INVOLUNTARY ADMIT  Goal: Will cooperate with staff recommendations and doctor's orders and will demonstrate appropriate behavior  Description: INTERVENTIONS:  - Treat underlying conditions and offer medication as ordered  - Educate regarding involuntary admission procedures and rules  - Utilize positive consistent limit setting strategies to support patient and staff safety  Outcome: Not Progressing

## 2021-01-23 NOTE — PROGRESS NOTES
Pt has been withdrawn to his room the entirety of the day, only came out to eat or to complain that he should not have been admitted to the hospital   He is loud, continually interrupting during conversation, displays pressured tangential speech  He is responding to internal stimuli, talking in his bathroom making statements like, "I'm going to matthew you" and "you are going to be arrested "  He remains paranoid, having persecutory delusions, seems to be hearing voices but denies it

## 2021-01-23 NOTE — PLAN OF CARE
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

## 2021-01-23 NOTE — PLAN OF CARE
Problem: DEPRESSION  Goal: Will be euthymic at discharge  Description: INTERVENTIONS:  - Administer medication as ordered  - Provide emotional support via 1:1 interaction with staff  - Encourage involvement in milieu/groups/activities  - Monitor for social isolation  Outcome: Not Progressing     Problem: PSYCHOSIS  Goal: Will report no hallucinations or delusions  Description: Interventions:  - Administer medication as  ordered  - Every waking shifts and PRN assess for the presence of hallucinations and or delusions  - Assist with reality testing to support increasing orientation  - Assess if patient's hallucinations or delusions are encouraging self-harm or harm to others and intervene as appropriate  Outcome: Not Progressing     Problem: SUBSTANCE USE/ABUSE  Goal: Will have no detox symptoms and will verbalize plan for changing substance-related behavior  Description: INTERVENTIONS:  - Monitor physical status and assess for symptoms of withdrawal  - Administer medication as ordered  - Provide emotional support with 1 on 1 interaction with staff  - Encourage recovery focused program/ addiction education  - Assess for verbalization of changing behaviors related to substance abuse  - Initiate consults and referrals as appropriate (Case Management, Spiritual Care, etc )  Outcome: Not Progressing     Problem: SUBSTANCE USE/ABUSE  Goal: By discharge, will develop insight into their chemical dependency and sustain motivation to continue in recovery  Description: INTERVENTIONS:  - Attends all daily group sessions and scheduled AA groups  - Actively practices coping skills through participation in the therapeutic community and adherence to program rules  - Reviews and completes assignments from individual treatment plan  - Assist patient development of understanding of their personal cycle of addiction and relapse triggers  Outcome: Not Progressing     Problem: SUBSTANCE USE/ABUSE  Goal: By discharge, patient will have ongoing treatment plan addressing chemical dependency  Description: INTERVENTIONS:  - Assist patient with resources and/or appointments for ongoing recovery based living  Outcome: Not Progressing     Problem: INVOLUNTARY ADMIT  Goal: Will cooperate with staff recommendations and doctor's orders and will demonstrate appropriate behavior  Description: INTERVENTIONS:  - Treat underlying conditions and offer medication as ordered  - Educate regarding involuntary admission procedures and rules  - Utilize positive consistent limit setting strategies to support patient and staff safety  Outcome: Not Progressing     Problem: INVOLUNTARY ADMIT  Goal: Will cooperate with staff recommendations and doctor's orders and will demonstrate appropriate behavior  Description: INTERVENTIONS:  - Treat underlying conditions and offer medication as ordered  - Educate regarding involuntary admission procedures and rules  - Utilize positive consistent limit setting strategies to support patient and staff safety  Outcome: Not Progressing

## 2021-01-24 PROBLEM — F25.0 SCHIZOAFFECTIVE DISORDER, BIPOLAR TYPE (HCC): Status: ACTIVE | Noted: 2021-01-24

## 2021-01-24 PROCEDURE — 99232 SBSQ HOSP IP/OBS MODERATE 35: CPT | Performed by: PSYCHIATRY & NEUROLOGY

## 2021-01-24 NOTE — PROGRESS NOTES
Pt has been withdrawn to his room much of the day  He is able to make his needs known, came out to ask for utensils at one point but has not cooperated with medications or blood work  He still insists that he should not be here  His appetite has been good and he has not had any behavioral issues  He also has not been loudly talking to himself, internal stimuli not obvious if occurring but pt denies all S/S

## 2021-01-24 NOTE — PROGRESS NOTES
Pt remains internally stimulated, uncooperative and unwilling to be educated regarding symptoms  Pt approached writer this morning asking to get into his locker and wanting to call several people, stated he was going to tell them to come here to get him discharged  Pt educated regarding visitation policies and discharge planning  He is defensive, stating he does not need to be here, would not take medication despite encouragement  Writer attempting reality orientation told pt that he was talking to himself in his room all day, obviously hallucinating  Pt disregarding this and waved his hand, dismissive, walked away  He remains withdrawn to his room, paranoid, having persecutory delusions of police setting him up, very grandiose and manic

## 2021-01-24 NOTE — PLAN OF CARE
Problem: DEPRESSION  Goal: Will be euthymic at discharge  Description: INTERVENTIONS:  - Administer medication as ordered  - Provide emotional support via 1:1 interaction with staff  - Encourage involvement in milieu/groups/activities  - Monitor for social isolation  Outcome: Progressing     Problem: SUBSTANCE USE/ABUSE  Goal: Will have no detox symptoms and will verbalize plan for changing substance-related behavior  Description: INTERVENTIONS:  - Monitor physical status and assess for symptoms of withdrawal  - Administer medication as ordered  - Provide emotional support with 1 on 1 interaction with staff  - Encourage recovery focused program/ addiction education  - Assess for verbalization of changing behaviors related to substance abuse  - Initiate consults and referrals as appropriate (Case Management, Spiritual Care, etc )  Outcome: Progressing  Goal: By discharge, will develop insight into their chemical dependency and sustain motivation to continue in recovery  Description: INTERVENTIONS:  - Attends all daily group sessions and scheduled AA groups  - Actively practices coping skills through participation in the therapeutic community and adherence to program rules  - Reviews and completes assignments from individual treatment plan  - Assist patient development of understanding of their personal cycle of addiction and relapse triggers  Outcome: Progressing  Goal: By discharge, patient will have ongoing treatment plan addressing chemical dependency  Description: INTERVENTIONS:  - Assist patient with resources and/or appointments for ongoing recovery based living  Outcome: Progressing     Problem: PSYCHOSIS  Goal: Will report no hallucinations or delusions  Description: Interventions:  - Administer medication as  ordered  - Every waking shifts and PRN assess for the presence of hallucinations and or delusions  - Assist with reality testing to support increasing orientation  - Assess if patient's hallucinations or delusions are encouraging self-harm or harm to others and intervene as appropriate  Outcome: Not Progressing     Problem: INVOLUNTARY ADMIT  Goal: Will cooperate with staff recommendations and doctor's orders and will demonstrate appropriate behavior  Description: INTERVENTIONS:  - Treat underlying conditions and offer medication as ordered  - Educate regarding involuntary admission procedures and rules  - Utilize positive consistent limit setting strategies to support patient and staff safety  Outcome: Not Progressing

## 2021-01-24 NOTE — NURSING NOTE
Pt refused 2100 Seroquel 100mg stating to writer, "Honey, I'm 55 and I've never taken any pills, I don't believe in them "

## 2021-01-24 NOTE — PROGRESS NOTES
Progress Notes- Behavioral Health       Assessment/Plan  Principal Problem:  F25 Schizoaffective DO Bipolar  F[age] Meth Use DO  PLAN:   1) Continue current medications  2) Continue Group and individual therapy  3) Discharge planing  4) discussed with staff  INTERVAL HISTORY:  The patient was seen for follow-up appointment  Patient was more home so gang yesterday  He continues to have delusional thought processes  However he is not as tangential or circumstantial   He said he slept well and he feels much better  He continues to have delusional thought processes he did more elaborate stories to tell today as he did yesterday  He is still talk about his ex-girlfriend and said that he wants to be  again  He said his only goal in life is to find a part that is down for history  He denied any hallucinations or delusions did not appear to be responding to any unseen stimuli  Staff also reported that patient is overall compliant and has no problems    Scheduled Meds:  Scheduled Meds:  Current Facility-Administered Medications   Medication Dose Route Frequency Provider Last Rate    acetaminophen  650 mg Oral Q6H PRN Marc C Holter, DO      acetaminophen  650 mg Oral Q4H PRN Marc C Holter, DO      acetaminophen  975 mg Oral Q6H PRN Marc C Holter, DO      aluminum-magnesium hydroxide-simethicone  30 mL Oral Q4H PRN Marc C Holter, DO      benztropine  1 mg Intramuscular Q1H PRN [place] C Holter, DO      benztropine  1 mg Oral Q1H PRN [place] C Holter, DO      hydrOXYzine HCL  25 mg Oral Q4H PRN Marc C Holter, DO      LORazepam  2 mg Intramuscular Q4H PRN [place] C Holter, DO      LORazepam  1 mg Oral Q4H PRN Marc C Holter, DO      magnesium hydroxide  30 mL Oral Daily PRN [place] C Holter, DO      nicotine  1 patch Transdermal Daily PRN SHAYNE Valentine      OLANZapine  10 mg Intramuscular Q3H PRN [place] C Holter, DO      OLANZapine  10 mg Oral Q3H PRN Gamb[place] C Holter, DO      OLANZapine  5 mg Oral Q3H PRN Gamb[place] C Holter, DO      OLANZapine  7 5 mg Oral Q3H PRN [place] C Holter, DO      pantoprazole  20 mg Oral Daily Jesse, SHAYNE      QUEtiapine  100 mg Oral HS Ti Clark MD      traZODone  50 mg Oral HS PRN [place] C Holter, DO       Continuous Infusions:   PRN Meds:   acetaminophen    acetaminophen    acetaminophen    aluminum-magnesium hydroxide-simethicone    benztropine    benztropine    hydrOXYzine HCL    LORazepam    LORazepam    magnesium hydroxide    nicotine    OLANZapine    OLANZapine    OLANZapine    OLANZapine    traZODone    Allergies: Allergies   Allergen Reactions    Bee Venom     Penicillins      States he had a reaction as a baby, doesn't know reaction     Review of systems:    Past Medical History:   Diagnosis Date    Psychiatric illness     Psychosis ([facility] [address] )        Vitals:    01/23/21 1100 01/23/21 1546 01/24/21 0719 01/24/21 1054   BP: 147/96 159/97 142/100 137/82   BP Location: Left arm Left arm Right arm Right arm   Pulse: 79 78 77 78   Resp: 16 16 16 16   Temp:  98 °F (36 7 °C) 97 8 °F (36 6 °C)    TempSrc:  Temporal Temporal    SpO2:       Weight:       Height:         Mental Status Evaluation:  Patient appeared of his age still was wearing black T-shirt  He made fair eye contact  Gait is normal steady  Psychomotor activity is reduced  Thought process is more organized today than yesterday  He still continues to have delusional thought process however he did not elaborate on it today  Continues to have grandiosity which could be partially his current state of past history  As he described that he has a lot of money and multiple properties and cars that was sold because affect  He denied any hallucinations delusions denied any suicidal homicidal ideas  Recent remote memory intact impaired judgment poor insight  Lab Results: I have personally reviewed pertinent lab results          NOTE:  Total of  20  minutes were spent in talking to patient completing this medical record reviewing medical chart medical decision making    Edelmira Rojas MD

## 2021-01-25 PROBLEM — F25.0 SCHIZOAFFECTIVE DISORDER, BIPOLAR TYPE (HCC): Chronic | Status: ACTIVE | Noted: 2021-01-24

## 2021-01-25 LAB
ALBUMIN SERPL BCP-MCNC: 4.1 G/DL (ref 3–5.2)
ALP SERPL-CCNC: 61 U/L (ref 43–122)
ALT SERPL W P-5'-P-CCNC: 30 U/L (ref 9–52)
ANION GAP SERPL CALCULATED.3IONS-SCNC: 6 MMOL/L (ref 5–14)
AST SERPL W P-5'-P-CCNC: 31 U/L (ref 17–59)
BASOPHILS # BLD AUTO: 0.1 THOUSANDS/ΜL (ref 0–0.1)
BASOPHILS NFR BLD AUTO: 1 % (ref 0–1)
BILIRUB SERPL-MCNC: 0.6 MG/DL
BUN SERPL-MCNC: 16 MG/DL (ref 5–25)
CALCIUM SERPL-MCNC: 9.2 MG/DL (ref 8.4–10.2)
CHLORIDE SERPL-SCNC: 105 MMOL/L (ref 97–108)
CHOLEST SERPL-MCNC: 195 MG/DL
CO2 SERPL-SCNC: 27 MMOL/L (ref 22–30)
CREAT SERPL-MCNC: 0.85 MG/DL (ref 0.7–1.5)
EOSINOPHIL # BLD AUTO: 0.5 THOUSAND/ΜL (ref 0–0.4)
EOSINOPHIL NFR BLD AUTO: 8 % (ref 0–6)
ERYTHROCYTE [DISTWIDTH] IN BLOOD BY AUTOMATED COUNT: 14.3 %
GFR SERPL CREATININE-BSD FRML MDRD: 98 ML/MIN/1.73SQ M
GLUCOSE SERPL-MCNC: 102 MG/DL (ref 70–99)
HCT VFR BLD AUTO: 48.5 % (ref 41–53)
HDLC SERPL-MCNC: 47 MG/DL
HGB BLD-MCNC: 16 G/DL (ref 13.5–17.5)
LDLC SERPL CALC-MCNC: 138 MG/DL
LYMPHOCYTES # BLD AUTO: 1.7 THOUSANDS/ΜL (ref 0.5–4)
LYMPHOCYTES NFR BLD AUTO: 25 % (ref 25–45)
MCH RBC QN AUTO: 27.6 PG (ref 26–34)
MCHC RBC AUTO-ENTMCNC: 32.9 G/DL (ref 31–36)
MCV RBC AUTO: 84 FL (ref 80–100)
MONOCYTES # BLD AUTO: 0.6 THOUSAND/ΜL (ref 0.2–0.9)
MONOCYTES NFR BLD AUTO: 8 % (ref 1–10)
NEUTROPHILS # BLD AUTO: 4 THOUSANDS/ΜL (ref 1.8–7.8)
NEUTS SEG NFR BLD AUTO: 58 % (ref 45–65)
NONHDLC SERPL-MCNC: 148 MG/DL
PLATELET # BLD AUTO: 253 THOUSANDS/UL (ref 150–450)
PMV BLD AUTO: 8.3 FL (ref 8.9–12.7)
POTASSIUM SERPL-SCNC: 4.5 MMOL/L (ref 3.6–5)
PROT SERPL-MCNC: 7.2 G/DL (ref 5.9–8.4)
RBC # BLD AUTO: 5.79 MILLION/UL (ref 4.5–5.9)
SODIUM SERPL-SCNC: 138 MMOL/L (ref 137–147)
TRIGL SERPL-MCNC: 51 MG/DL
WBC # BLD AUTO: 6.9 THOUSAND/UL (ref 4.5–11)

## 2021-01-25 PROCEDURE — 80053 COMPREHEN METABOLIC PANEL: CPT | Performed by: PSYCHIATRY & NEUROLOGY

## 2021-01-25 PROCEDURE — 85025 COMPLETE CBC W/AUTO DIFF WBC: CPT | Performed by: PSYCHIATRY & NEUROLOGY

## 2021-01-25 PROCEDURE — 99232 SBSQ HOSP IP/OBS MODERATE 35: CPT | Performed by: PSYCHIATRY & NEUROLOGY

## 2021-01-25 PROCEDURE — 80061 LIPID PANEL: CPT | Performed by: PSYCHIATRY & NEUROLOGY

## 2021-01-25 PROCEDURE — 86592 SYPHILIS TEST NON-TREP QUAL: CPT | Performed by: PSYCHIATRY & NEUROLOGY

## 2021-01-25 RX ORDER — QUETIAPINE FUMARATE 50 MG/1
50 TABLET, FILM COATED ORAL DAILY
Status: DISCONTINUED | OUTPATIENT
Start: 2021-01-25 | End: 2021-01-26

## 2021-01-25 RX ADMIN — ALUMINUM HYDROXIDE, MAGNESIUM HYDROXIDE, AND SIMETHICONE 30 ML: 200; 200; 20 SUSPENSION ORAL at 22:13

## 2021-01-25 NOTE — PROGRESS NOTES
Pt was seen at breakfast, remains hyperverbal and would not cooperate with medications, refusing protonix stating he does not take any medications, he never has  He was defensive about symptoms, stating that he was "rehearsing" for several planned lawsuits when he was talking to himself in his room on Saturday, states the police that did this to him (brought him to the hospital) would be in big trouble  He remains pressured in speech, circumstantial   Pt denies all S/S, no insight

## 2021-01-25 NOTE — CASE MANAGEMENT
Patient admitted 01/22/2021 on a [facility] from [facility] [address]   met with patient in order to complete the initial intake/assessment and patient presents loud, pressured,paranoid, manic, easily agitated, rambling and tangential  Limited history and intake obtained due to patients argumentativeness and agitation  Norbert Moya reports this is his first inpatient psychiatric hospitalization and denies a history of outpatient [address] services  Norbert Moya stated,"I'm a normal human being that was brought to the ER  That should never have happened,the police came and beat me up"  He then said he was unlawfully brought to the hospital and the police are protecting Otis Pastrana  Norbert Moya also reported he is from the richest [place] in [place] and lived in a [place]  Norbert Moya denies drug and alcohol  UDS was positive for amph/meth  PAWSS 0  AUDIT 0/40  Norbert Moya denies access to firearms  Norbert Moya reports he is currently receiving unemployment and receives $225/week  Norbert Moya reports his unemployment card was stolen recently but  he did get another card and is concerned the card will be stolen again  Patient reports while at home, a car sits at the top of the hill and one at the bottom and "they are waiting to take my card"  Norbert Moya said he told the police and the police do not believe him  Norbert Moya reports he does not have a pharmacy because he has never needed any prescription medications  Norbert Moya reports he lives with a friend in [place] and said his friend is now in a 27 day D&A program   Norbert Moya denies any legal issues   unable to obtain JACQUIE 's at this time  Officer Prescott Ear( [phone]) was petitioner for the [id number] and per petition, Norbert Moya states  retired police detectives are stalking him  Norbert Moya claims they implanted a transmitter in his head and are communicating and mocking him through it   Norbert Moya said that retired police officers have been trying to force him to turn rocha for the past 5 years and are stalking him because he won't turn rocha  [facility] also claims his car has had devices installed on it which track his location and can turn his car on and off  ER documentation states [facility] reported to others that meth helps him focus

## 2021-01-25 NOTE — CASE MANAGEMENT
FERDINAND called in; [facility] provided unit hallway phone number  So, the  can call discuss the 18 petition defense for the patient

## 2021-01-25 NOTE — NURSING NOTE
Patient refused his morning medicine Pt states "I don't take any medicine unless it's prescribed by my Family Doctor"

## 2021-01-25 NOTE — PLAN OF CARE
Problem: DEPRESSION  Goal: Will be euthymic at discharge  Description: INTERVENTIONS:  - Administer medication as ordered  - Provide emotional support via 1:1 interaction with staff  - Encourage involvement in milieu/groups/activities  - Monitor for social isolation  Outcome: Progressing     Problem: PSYCHOSIS  Goal: Will report no hallucinations or delusions  Description: Interventions:  - Administer medication as  ordered  - Every waking shifts and PRN assess for the presence of hallucinations and or delusions  - Assist with reality testing to support increasing orientation  - Assess if patient's hallucinations or delusions are encouraging self-harm or harm to others and intervene as appropriate  Outcome: Not Progressing     Problem: SUBSTANCE USE/ABUSE  Goal: Will have no detox symptoms and will verbalize plan for changing substance-related behavior  Description: INTERVENTIONS:  - Monitor physical status and assess for symptoms of withdrawal  - Administer medication as ordered  - Provide emotional support with 1 on 1 interaction with staff  - Encourage recovery focused program/ addiction education  - Assess for verbalization of changing behaviors related to substance abuse  - Initiate consults and referrals as appropriate (Case Management, Spiritual Care, etc )  Outcome: Progressing  Goal: By discharge, will develop insight into their chemical dependency and sustain motivation to continue in recovery  Description: INTERVENTIONS:  - Attends all daily group sessions and scheduled AA groups  - Actively practices coping skills through participation in the therapeutic community and adherence to program rules  - Reviews and completes assignments from individual treatment plan  - Assist patient development of understanding of their personal cycle of addiction and relapse triggers  Outcome: Progressing  Goal: By discharge, patient will have ongoing treatment plan addressing chemical dependency  Description: INTERVENTIONS:  - Assist patient with resources and/or appointments for ongoing recovery based living  Outcome: Progressing     Problem: INVOLUNTARY ADMIT  Goal: Will cooperate with staff recommendations and doctor's orders and will demonstrate appropriate behavior  Description: INTERVENTIONS:  - Treat underlying conditions and offer medication as ordered  - Educate regarding involuntary admission procedures and rules  - Utilize positive consistent limit setting strategies to support patient and staff safety  Outcome: Not Progressing

## 2021-01-25 NOTE — CASE MANAGEMENT
303 has been sent to [facility] via [employer]  Pt's 18 hearing is scheduled on 1/26/21 at 10 AM      CM to follow up

## 2021-01-25 NOTE — PLAN OF CARE
Problem: Ineffective Coping  Goal: Participates in unit activities  Description: Interventions:  - Provide therapeutic environment   - Provide required programming   - Redirect inappropriate behaviors   1/25/2021 1342 by ARIELLE Mcgee  Outcome: Not Progressing

## 2021-01-25 NOTE — DISCHARGE INSTR - OTHER ORDERS
[facility] Crisis Phone Number : [phone]    [facility] Crisis Phone Number : [phone]    National Suicide Hotline : 1 591.353.3529        Crisis Text Line : [address] is a toll-free telephone number for people in [facility] who are seeking a listening ear for additional support in their recovery from mental illness  The PEER LINE is peer-run and peer-friendly  Callers to the [address] will speak directly to other individuals who have experienced the mental health recovery process  The PEER LINE staff possess these three core values to assist and support the PEER LINE caller:  Acceptance   [facility]  Promote individual recovery and strengthen communities  Funding Information  This project is funded, in part, under contract with [facility], through funds provided by the [employer]  Recovery Partnership has always promoted, used, and remains faithful to procedure and language that reflects recovery-based and person-first principles  [facility]'s [employer]   You can call the Peer Line 24 hours a day  Phone: 0-348-SZ-PEERS  (8-747.209.6523)             What you need to know aboutcoronavirus disease 2019 (COVID-19)     What is coronavirus disease 2019 (COVID-19)? Coronavirus disease 2019 (COVID-19) is a respiratory illness that can spread from person to person  The virus that causes COVID-19 is a novel coronavirus that was first identified during an investigation into an outbreak in [place]  Can people in the [place]  get COVID-19? Yes  COVID-19 is spreading from person to person in parts of the [place]  Risk of infection with COVID-19 is higher for people who are close contacts of someone known to have COVID-19, for example healthcare workers, or household members   Other people at higher risk for infection are those who live in or have recently been in an area with ongoing spread of COVID-19  Learn more about places with ongoing spread at   [facility]  html#geographic  Have there been cases of COVID-19 in the [place] ?   Yes  The first case of COVID-19 in the [place] was reported on January 21, 2020  The current count of cases of COVID-19 in the [place] is available on [employer] at [facility]  How does COVID-19 spread? The virus that causes COVID-19 probably emerged from an animal source, but is now spreading from person to person  The virus is thought to spread mainly between people who are in close contact with one another (within about 6 feet) through respiratory droplets produced when an infected person coughs or sneezes  It also may be possible that a person can get COVID-19 by touching a surface or object that has the virus on it and then touching their own mouth, nose, or possibly their eyes, but this is not thought to be the main way the virus spreads  Learn what is known about the spread of newly emerged coronaviruses at [facility]  What are the symptoms of COVID-19? Patients with COVID-19 have had mild to severe respiratory illness with symptoms of   fever   cough   shortness of breath  What are severe complications from this virus? Some patients have pneumonia in both lungs, multi-organ failure and in some cases death  How can I help protect myself? People can help protect themselves from respiratory illness with everyday preventive actions  Avoid close contact with people who are sick  Avoid touching your eyes, nose, and mouth withunwashed hands  Wash your hands often with soap and water for at least 20 seconds   Use an alcohol-based hand  that contains at least 60% alcohol if soap and water are not available  If you are sick, to keep from spreading respiratory illness to others, you should   Stay home when you are sick  Cover your cough or sneeze with a tissue, then throw the tissue in the trash  Clean and disinfect frequently touched objectsand surfaces  What should I do if I recently traveled from an area with ongoing spread of COVID-19? If you have traveled from an affected area, there may be restrictions on your movements for up to 2 weeks  If you develop symptoms during that period (fever, cough, trouble breathing), seek medical advice  Call the office of your health care provider before you go, and tell them about your travel and your symptoms  They will give you instructions on how to get care without exposing other people to your illness  While sick, avoid contact with people, don't go out and delay any travel to reduce the possibility of spreading illness to others  Is there a vaccine? There is currently no vaccine to protect against COVID-19  The best way to prevent infection is to take everyday preventive actions, like avoiding close contact with people who are sick and washing your hands often  Is there a treatment? There is no specific antiviral treatment for COVID-19  People with COVID-19 can seek medical care to helprelieve symptoms  For more information: www cdc gov/CGPWD92EU 423460-N 03/03/2020       What to do if you are sick withcoronavirus disease 2019 (COVID-19)     If you are sick with COVID-19 or suspect you are infected with the virus that causes COVID-19, follow the steps below to help prevent the disease from spreading to people in your home and community  Stay home except to get medical care   You should restrict activities outside your home, except for getting medical care  Do not go to work, school, or public areas  Avoid using public transportation, ride-sharing, or taxis  Separate yourself from other people and animals inyour home  People:  As much as possible, you should stay in a specific room and away from other people in your home  Also, you should use a separate bathroom, if available  Animals: Do not handle pets or other animals while sick  See COVID-19 and Animals for more information  Call ahead before visiting your doctor   If you have a medical appointment, call the healthcare provider and tell them that you have or may have COVID-19  This will help the healthcare provider's office take steps to keep other people from getting infected or exposed  Wear a facemask  You should wear a facemask when you are around other people (e g , sharing a room or vehicle) or pets and before you enter a healthcare provider's office  If you are not able to wear a facemask (for example, because it causes trouble breathing), then people who live with you should not stay in the same room with you, or they should wear a facemask if they enteryour room  Cover your coughs and sneezes   Cover your mouth and nose with a tissue when you cough or sneeze  Throw used tissues in a lined trash can; immediately wash your hands with soap and water for at least 20 seconds or clean your hands with an alcohol-based hand  that contains at least 60 to 95% alcohol, covering all surfaces of your hands and rubbing them together until they feel dry  Soap and water should be used preferentially if hands are visibly dirty  Avoid sharing personal household items   You should not share dishes, drinking glasses, cups, eating utensils, towels, or bedding with other people or pets in your home  After using these items, they should be washed thoroughly with soap and water  Clean your hands often  Wash your hands often with soap and water for at least 20 seconds  If soap and water are not available, clean your hands with an alcohol-based hand  that contains at least 60% alcohol, covering all surfaces of your hands and rubbing them together until they feel dry   Soap and water should be used preferentially if hands are visibly dirty  Avoid touching your eyes, nose, and mouth with unwashed hands  Clean all "high-touch" surfaces every day  High touch surfaces include counters, tabletops, doorknobs, bathroom fixtures, toilets, phones, keyboards, tablets, and bedside tables  Also, clean any surfaces that may have blood, stool, or body fluids on them  Use a household cleaning spray or wipe, according to the label instructions  Labels contain instructions for safe and effective use of the cleaning product including precautions you should take when applying the product, such as wearing gloves and making sure you have good ventilation during use of the product  Monitor your symptoms  Seek prompt medical attention if your illness is worsening (e g , difficulty breathing)  Before seeking care, call your healthcare provider and tell them that you have, or are being evaluated for, COVID-19  Put on a facemask before you enter the facility  These steps will help the healthcare provider's office to keep other people in the office or waiting room from getting infectedor exposed  Ask your healthcare provider to call the local or state health department  Persons who are placed under active monitoring or facilitated self-monitoring should follow instructions provided by their local health department or occupational health professionals, as appropriate  If you have a medical emergency and need to call 911, notify the dispatch personnel that you have, or are being evaluated for COVID-19  If possible, put on a facemask before emergency medical services arrive  Discontinuing home isolation  Patients with confirmed COVID-19 should remain under home isolation precautions until the risk of secondary transmission to others is thought to be low   The decision to discontinue home isolation precautions should be made on a case-by-case basis, in consultation with healthcare providers and state and local health departments  For more information: www [facility] gov/HVTAZ55FO 039598-H 02/24/2020       Stay home when you are sick,except to get medical care  Wash your hands often with soap and water for at least 20 seconds  Cover your cough or sneeze with a tissue, then throw the tissue in the trash  Clean and disinfect frequently touched objects and surfaces  Avoid touching your eyes, nose, and mouth  STOP THE SPREAD OF GERMS  For more information: www cdc gov/COVID19 Avoid close contact with people who are sick  Help prevent the spread of respiratory diseases like COVID-19  Team [place]   COVID-19 CRISIS COUNSELING PROGRAM   GET CONNECTED WITH A FREE CRISIS COUNSELOR   CALL [phone]   Do you feel    Stressed? Overwhelmed? Alone? Afraid? Anxiety? During these uncertain times, you are not alone  We are here to listen  Please call our [facility] and Referral Line   [phone] TTY: [phone]   There are trained professionals available 24/7 ready to help you navigate these unprecedented challenges  These services are FREE & CONFIDENTIAL  This publication was made possible by Irvin Henry Number [id number], in collaboration with the [address] [place] [address]  The [facility] Family-to-Family Education Program is a free 12-week (2 1/2 hours/week) course for families of individuals with severe brain disorders (mental illnesses)  The classes are taught by trained family members  All course materials are furnished at no cost to you  Below are some details  To register, e-mail [email or web address] or call [phone]  The curriculum focuses on schizophrenia, bipolar disorder (manic depression), clinical depression, panic disorders and obsessive-compulsive disorder (OCD)  The course discusses the clinical treatment of these illnesses and teaches the knowledge and skills that family members need to cope more effectively    Topics Include:   Learning about feelings, learning about facts    Schizophrenia, major depression and dusty: diagnosis and dealing with critical periods    Subtypes of depression and bipolar disorder, panic disorder and OCD; diagnosis and causes; sharing our stories    The biology of the brain/new research    Problem solving workshops    Medication review    Empathy workshop  what its like to have a brain disorder    Communication skills workshop    Self-care and relative groups   [facility], services available    Advocacy: fighting stigma    Review and certification ceremony    Muxa-jq-Ltlq Education Course  The [employer] Education class is a ten week  two hours per week  experiential education course on the topic of recovery for any person with serious mental illness who is interested in establishing and maintaining wellness  The course uses a combination of lecture, interactive exercises, and structural group processes  The diversity of experience among participants affords for a lively dynamic that moves the course along  [facility]s Qujb-bs-Fble Education class is offered free of charge to people who experience mental illness  You do not need to be a member of [facility] to take the course  Courses are taught by teams of trained mentors/peer-teachers who are themselves experienced at living well with mental illness  Below are some details  To register, call [phone] or e-mail [email or web address]  Sign up today! [facility] group is for family members, caregivers, and loved ones of individuals living with the everyday challenges of mental illness  The leaders are family members in the same situation  Sessions take place in an intimate, confidential setting to allow families to share openly with each other  These support groups allow participants to learn from the experiences of other group members, share coping strategies, and offer each other encouragement and understanding   Jean Claude Ngo know that you are not alone  Drop inno registration is necessary  Here are the times and locations  [place]  Monthly: 3rd Monday, 7:00-8:30 pm  [address]  [place] [address], [place]  Monthly: 4th Tuesday, 7:00-8:30 pm  [facility]  Monthly: 1st Monday, 7:00-8:30 pm  [facility]  [address] [facility], [facility], [address] [place] [address]         Monthly Support for Persons with Mental Illness  The Peer Support Group is a monthly meeting for individuals facing the challenges of recovering from severe and persistent mental illness  Depression, manic depression, schizophrenia, and general anxiety disorder are only a few of the diagnoses of individuals who have found a supportive place at our meetings  Our [place]  We are a fellowship of individuals who share a common goal of recovery and the ability to maintain mental and emotional stability  We help others and ourselves through sharing our experiences, strength and hope with each other  No matter how traumatic our past or how despairing our present may be, there is hope for a new day  Sessions take place in an intimate, confidential setting to allow individuals to share openly with each other  Hannah Pearl know that you are not alone  Drop inno registration is necessary  Here are the times and locations    [place]  Monthly: 1st Monday, 7:00-8:30 pm  [facility]  [address] [place], [place]   [id number]  Monthly: 3rd Monday, 7:00-8:30 pm  [address]  [facility]

## 2021-01-25 NOTE — TREATMENT TEAM
01/25/21 1205   Team Meeting   Meeting Type Tx Team Meeting   Initial Conference Date 01/25/21   Next Conference Date 02/24/21   Team Members Present   Team Members Present Physician;Nurse;   Physician Team Member Dr LOAIZA   Nursing Team Member [address] [place] Management Team Member Megan   Patient/Family Present   Patient Present Yes   Patient's Family Present No     Treatment team met, reviewed treatment goals  Pt needed multiple redirections as he was distracted and blaming police for 509 him because he has a evidence against   Pt "I know magistrates, judges, superintendent around here  I just need to make one call and I will be out of here"  Pt stated since I do not have my glasses on me, I can not read so I will not sign if I can not read  Pt is hyperverbal and has poor insights into this admissions  Continue to monitor

## 2021-01-25 NOTE — TREATMENT TEAM
01/25/21 0834   Team Meeting   Meeting Type Daily Rounds   Team Members Present   Team Members Present Physician;Nurse;; Other (Discipline and Name)   Physician Team Member Dr LOAIZA   Nursing Team Member [facility] Management Team Member Henna Santizo   Other (Discipline and Name)    Patient/Family Present   Patient Present No   Patient's Family Present No     [id number] and brought in by police, admitted due to paranoid delusions of police trying to control him and put a transmitters into his head  Continue with 303 commitment process  Continue to monitor

## 2021-01-25 NOTE — CASE MANAGEMENT
634 petitioner  Josep Jennifer from [address] was called and provided 303 hearing conference details were provided which is scheduled for tomorrow 1/26/21 at 10 AM

## 2021-01-25 NOTE — CASE MANAGEMENT
303 rights reviewed patient does not understand the rights  A copy of the right is provided to the patient

## 2021-01-25 NOTE — DISCHARGE INSTR - APPOINTMENTS
Giovana Loza RN, our [employer], will be calling you after your discharge, on the phone number that you provided  She will be available as an additional support, if needed  If you wish to speak with her, you may contact [facility] at [phone]

## 2021-01-25 NOTE — NURSING NOTE
Patient refused HS scheduled medications, stating "No thank you  I don't need those  I am not crazy like these other people  I am just trying to get out " Patient was polite during this encounter  Remained isolative to room throughout the whole evening and night  Refused snack

## 2021-01-25 NOTE — PROGRESS NOTES
Pt refusing scheduled seroquel  He was lying down when approached, stated he had spoken with the doctor for taking a medication for anxiety and stating, "I know all of them" and started to name them  Pt was told that he would be receiving seroquel and it would help with anxiety and he protested, scrunching his face, stating I never heard of that what is it? When pt heard what class of medication he would be receiving he got up, loudly refusing, accusing staff of ruining his life, interfering with his work, interfering with his ability to have a romantic relationship  He is very preoccpied with finding a partner by the time he is 61 or he will feel like a failure  Pt thought process very concrete
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
## 2021-01-26 LAB — RPR SER QL: NORMAL

## 2021-01-26 PROCEDURE — 99232 SBSQ HOSP IP/OBS MODERATE 35: CPT | Performed by: PSYCHIATRY & NEUROLOGY

## 2021-01-26 RX ORDER — OLANZAPINE 10 MG/1
10 INJECTION, POWDER, LYOPHILIZED, FOR SOLUTION INTRAMUSCULAR
Status: DISCONTINUED | OUTPATIENT
Start: 2021-01-26 | End: 2021-01-27

## 2021-01-26 RX ORDER — ARIPIPRAZOLE 10 MG/1
10 TABLET ORAL
Status: DISCONTINUED | OUTPATIENT
Start: 2021-01-26 | End: 2021-01-27

## 2021-01-26 RX ORDER — OLANZAPINE 10 MG/1
10 TABLET, ORALLY DISINTEGRATING ORAL
Status: DISCONTINUED | OUTPATIENT
Start: 2021-01-26 | End: 2021-01-27

## 2021-01-26 RX ADMIN — OLANZAPINE 10 MG: 10 TABLET, ORALLY DISINTEGRATING ORAL at 21:53

## 2021-01-26 RX ADMIN — LORAZEPAM 1 MG: 1 TABLET ORAL at 15:32

## 2021-01-26 RX ADMIN — ARIPIPRAZOLE 10 MG: 10 TABLET ORAL at 21:53

## 2021-01-26 NOTE — TREATMENT PLAN
TREATMENT PLAN REVIEW - [address] Darrell Kumar Splinter 54 y o  1965 male MRN: [id number]    [address] [address] Room / Bed: Donnie Kong [address] [place] [address] [id number] Encounter: [id number]        Admit Date/Time:  1/22/2021  7:28 PM    Treatment Team: Attending Provider: Marii Copeland MD; Consulting Physician: Lissett Dahl MD; Resident: Bg Izaguirre DO; Care Manager: Radha Nino; Registered Nurse: Dottie Fink RN; Care Manager: Bahman Delatorre RN; Patient Care Technician: Aaron Ruiz; Patient Care Technician: Alexandra Caicedo; Patient Care Technician: Abhilash Bower; : Nalini Webb; Patient Care Assistant: Padmini Tan; Patient Care Assistant: Desirae Palafox; Registered Nurse:  Shawn Ryan RN    Diagnosis: Principal Problem:    Schizoaffective disorder, bipolar type [facility])  Active Problems:    Medical clearance for psychiatric admission    Patient Strengths/Assets: capable of independent living, self reliant      Patient Barriers/Limitations: noncompliant with medication, patient is on an involuntary commitment    Short Term Goals: decrease in manic symptoms, decrease in paranoid thoughts, decrease in psychotic symptoms    Long Term Goals: stabilization of mood, resolution of psychotic symptoms    Progress Towards Goals: starting psychiatric medications as prescribed    Recommended Treatment: medication management, patient medication education, group therapy, milieu therapy, continued Behavioral Health psychiatric evaluation/assessment process     Treatment Frequency: daily medication monitoring, group and milieu therapy daily, monitoring through interdisciplinary rounds, monitoring through weekly patient care conferences    Expected Discharge Date: 10 days - 2/5/2021    Discharge Plan: to be determined    Treatment Plan Created/Updated By: Bg Izaguirre DO

## 2021-01-26 NOTE — QUICK NOTE
SECOND OPINION FOR INJECTABLE PSYCHIATRIC MEDICATIONS    Darrian Bustamante 54 y o  male MRN: [id number]  Unit/Bed#: Sr[facility]i Standard 344-02 Encounter: [id number]      Reason for Admission/Current Symptoms:          Darrian Bustamante is a 54 y o  male with a history of hallucinations in the past as well as stimulant use  who was admitted to the inpatient psychiatric unit on a involuntary 302 commitment basis due to psychotic symptoms, delusional thoughts, paranoid ideation, bizarre behavior and substance abuse  Symptoms prior to admission included manic symptoms, bizarre behavior, paranoid ideation and delusional thinking with grandiose and persecutory delusions  Second opinion for injectable psychotropic medications was requested by Dr Promise Hernandez due to Cruz's refusal to take oral medications  On evaluation today Dima Wharton remains paranoid  With persecutory and paranoid delusions as well as grandiose delusions  Pt claims all he was doing was taking care of a friends cat and the police rushed in and took him down  Pt is guarded and evasive  Pt says he has never been diagnosed with a mental illness and that he has never taken any medication before  He can't understand why he is still here  He doesn't think he needs to be here  He feels he doesn't need medications     Mental Status Evaluation:    Appearance:  age appropriate, casually dressed, tattooed   Behavior:  fair eye contact, psychomotor agitation, evasive   Speech:  increased rate, hypertalkative   Mood:  irritable   Affect:  labile, increased in intensity tearful at times     Thought Process:  circumstantial, tangential, increased rate of thoughts, remains perseverative   Thought Content:  grandiose and persecutory delusions, paranoid ideation, grandiose   Perceptual Disturbances: does not appear responding to internal stimuli, denies auditory or visual hallucinations when asked   Risk Potential: Suicidal ideation - None at present  Homicidal ideation - None at present  Potential for aggression - No   Sensorium:  oriented to person, place and time/date   Memory:  unable to assess   Consciousness:  alert and awake    Attention: attention span and concentration are age appropriate   Insight:  poor   Judgment: poor   Gait/Station: normal gait/station, normal balance   Motor Activity: no abnormal movements       Vital signs in last 24 hours:    Temp:  [97 2 °F (36 2 °C)] 97 2 °F (36 2 °C)  HR:  [71] 71  Resp:  [16] 16  BP: (150)/(80) 150/80    Labs: I have personally reviewed all pertinent laboratory/tests results  Most Recent Labs:   Lab Results   Component Value Date    WBC 6 90 01/25/2021    RBC 5 79 01/25/2021    HGB 16 0 01/25/2021    HCT 48 5 01/25/2021     01/25/2021    RDW 14 3 01/25/2021    NEUTROABS 4 00 01/25/2021    SODIUM 138 01/25/2021    K 4 5 01/25/2021     01/25/2021    CO2 27 01/25/2021    BUN 16 01/25/2021    CREATININE 0 85 01/25/2021    GLUC 102 (H) 01/25/2021    CALCIUM 9 2 01/25/2021    AST 31 01/25/2021    ALT 30 01/25/2021    ALKPHOS 61 01/25/2021    TP 7 2 01/25/2021    ALB 4 1 01/25/2021    TBILI 0 60 01/25/2021    CHOLESTEROL 195 01/25/2021    HDL 47 01/25/2021    TRIG 51 01/25/2021    LDLCALC 138 (H) 01/25/2021    NONHDLC 148 01/25/2021    [id number] 2 239 01/22/2021    RPR Non-Reactive 01/25/2021       Medications: All current active medications have been reviewed      Current medications:  Current Facility-Administered Medications   Medication Dose Route Frequency Provider Last Rate    acetaminophen  650 mg Oral Q6H PRN Livia BALL Holter, DO      acetaminophen  650 mg Oral Q4H PRN Marc Quezadater, DO      acetaminophen  975 mg Oral Q6H PRN Jordan C Holter, DO      aluminum-magnesium hydroxide-simethicone  30 mL Oral Q4H PRN Livia C Holter, DO      ARIPiprazole  10 mg Oral HS Mariella Arroyo MD      benztropine  1 mg Intramuscular Q1H PRN [place] C Holter,       benztropine  1 mg Oral Q1H PRN Marc Quezadater, DO      hydrOXYzine HCL  25 mg Oral Q4H PRN [place] C Holter, DO      LORazepam  2 mg Intramuscular Q4H PRN [place] C Holter, DO      LORazepam  1 mg Oral Q4H PRN Marc C Holter, DO      magnesium hydroxide  30 mL Oral Daily PRN [place] C Holter, DO      nicotine  1 patch Transdermal Daily PRN SHAYNE Spivey      OLANZapine  10 mg Intramuscular Q3H PRN [place] C Holter, DO      OLANZapine  10 mg Oral Q3H PRN [place] C Holter, DO      OLANZapine  5 mg Oral Q3H PRN [place] C Holter, DO      OLANZapine  7 5 mg Oral Q3H PRN [place] C Holter, DO      pantoprazole  20 mg Oral Daily SHAYNE Valentine      traZODone  50 mg Oral HS PRN [place] C Holter, DO         Assessment/Plan     Principal Problem:    Schizoaffective disorder, bipolar type (HCC)  Active Problems:    Medical clearance for psychiatric admission      Recommended Treatment:     I agree with the need for injectable antipsychotic medications if his refuses oral medications  He is not likely to improve without medications and will require administration of injectable medications against his will to assure safety of self and others  I believe that he requires administration of injectable medications against his will to assure safety of self and others      Winsome Darby,  01/26/21

## 2021-01-26 NOTE — NURSING NOTE
Patient about the unit at the start of the shift  Pacing halls  Cooperative and pleasant on approach  Pressured, tangential, grandiose  Spoke about being a "proffesional golfer"  Patient also went on to talk about being friends with many judges as well police officers  Feels that he does not need to be here  Says that he does not need medication and that he doesn't want to put any pills in his body  " I go to the gym, I go tanning, I really take care of myself"  Patient also went on to talk about his childhood, his father being rocha and his mother being a  who he is "unsure if she had breast implants or not"
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
## 2021-01-26 NOTE — NURSING NOTE
Pt isolative to room all shift  In bed entire shift  MHTs report pt was out for dinner and quickly retreated back to room  Pt awakened easily for HS meds and assessment  He states he does not take medications or drugs for any reason, the only thing he would consider taking is ativan  He rambled on about medications and becoming addicted to the seroquel RN offered and again stated the only thing he would take is ativan, RN provided education that seroquel is not addictive but ativan is  Pt continued with pressured speech, talking about his manifesto and hearing tomorrow  He is not agitated or nasty but did voice delusions about police officers near his home stalking him  He hopes to leave after hearing

## 2021-01-26 NOTE — TREATMENT TEAM
01/26/21 0858   Team Meeting   Meeting Type Daily Rounds   Team Members Present   Team Members Present Physician;Nurse;   Physician Team Member Dr LOIAZA   Nursing Team Member [facility] Management Team Member Megan   Patient/Family Present   Patient Present No   Patient's Family Present No     Pt isolative to his room  Pt refused medication  Voiced delusions of  near his home stalking  Continue med management  303 hearing today at 10 Am  Continue to monitor

## 2021-01-26 NOTE — PLAN OF CARE
Problem: DEPRESSION  Goal: Will be euthymic at discharge  Description: INTERVENTIONS:  - Administer medication as ordered  - Provide emotional support via 1:1 interaction with staff  - Encourage involvement in milieu/groups/activities  - Monitor for social isolation  Outcome: Progressing     Problem: SUBSTANCE USE/ABUSE  Goal: Will have no detox symptoms and will verbalize plan for changing substance-related behavior  Description: INTERVENTIONS:  - Monitor physical status and assess for symptoms of withdrawal  - Administer medication as ordered  - Provide emotional support with 1 on 1 interaction with staff  - Encourage recovery focused program/ addiction education  - Assess for verbalization of changing behaviors related to substance abuse  - Initiate consults and referrals as appropriate (Case Management, Spiritual Care, etc )  Outcome: Progressing  Goal: By discharge, will develop insight into their chemical dependency and sustain motivation to continue in recovery  Description: INTERVENTIONS:  - Attends all daily group sessions and scheduled AA groups  - Actively practices coping skills through participation in the therapeutic community and adherence to program rules  - Reviews and completes assignments from individual treatment plan  - Assist patient development of understanding of their personal cycle of addiction and relapse triggers  Outcome: Progressing  Goal: By discharge, patient will have ongoing treatment plan addressing chemical dependency  Description: INTERVENTIONS:  - Assist patient with resources and/or appointments for ongoing recovery based living  Outcome: Progressing     Problem: Ineffective Coping  Goal: Participates in unit activities  Description: Interventions:  - Provide therapeutic environment   - Provide required programming   - Redirect inappropriate behaviors   Outcome: Progressing     Problem: DISCHARGE PLANNING  Goal: Discharge to home or other facility with appropriate resources  Description: INTERVENTIONS:  - Identify barriers to discharge w/patient and caregiver  - Arrange for needed discharge resources and transportation as appropriate  - Identify discharge learning needs (meds, wound care, etc )  - Arrange for interpretive services to assist at discharge as needed  - Refer to Case Management Department for coordinating discharge planning if the patient needs post-hospital services based on physician/advanced practitioner order or complex needs related to functional status, cognitive ability, or social support system  Outcome: Progressing     Problem: PSYCHOSIS  Goal: Will report no hallucinations or delusions  Description: Interventions:  - Administer medication as  ordered  - Every waking shifts and PRN assess for the presence of hallucinations and or delusions  - Assist with reality testing to support increasing orientation  - Assess if patient's hallucinations or delusions are encouraging self-harm or harm to others and intervene as appropriate  Outcome: Not Progressing     Problem: INVOLUNTARY ADMIT  Goal: Will cooperate with staff recommendations and doctor's orders and will demonstrate appropriate behavior  Description: INTERVENTIONS:  - Treat underlying conditions and offer medication as ordered  - Educate regarding involuntary admission procedures and rules  - Utilize positive consistent limit setting strategies to support patient and staff safety  Outcome: Not Progressing

## 2021-01-27 PROCEDURE — 99232 SBSQ HOSP IP/OBS MODERATE 35: CPT | Performed by: PSYCHIATRY & NEUROLOGY

## 2021-01-27 RX ORDER — OLANZAPINE 10 MG/1
10 INJECTION, POWDER, LYOPHILIZED, FOR SOLUTION INTRAMUSCULAR
Status: DISCONTINUED | OUTPATIENT
Start: 2021-01-27 | End: 2021-02-01 | Stop reason: HOSPADM

## 2021-01-27 RX ADMIN — OLANZAPINE 15 MG: 10 TABLET, ORALLY DISINTEGRATING ORAL at 21:45

## 2021-01-27 NOTE — PLAN OF CARE
Problem: DEPRESSION  Goal: Will be euthymic at discharge  Description: INTERVENTIONS:  - Administer medication as ordered  - Provide emotional support via 1:1 interaction with staff  - Encourage involvement in milieu/groups/activities  - Monitor for social isolation  Outcome: Progressing     Problem: PSYCHOSIS  Goal: Will report no hallucinations or delusions  Description: Interventions:  - Administer medication as  ordered  - Every waking shifts and PRN assess for the presence of hallucinations and or delusions  - Assist with reality testing to support increasing orientation  - Assess if patient's hallucinations or delusions are encouraging self-harm or harm to others and intervene as appropriate  Outcome: Progressing     Problem: SUBSTANCE USE/ABUSE  Goal: Will have no detox symptoms and will verbalize plan for changing substance-related behavior  Description: INTERVENTIONS:  - Monitor physical status and assess for symptoms of withdrawal  - Administer medication as ordered  - Provide emotional support with 1 on 1 interaction with staff  - Encourage recovery focused program/ addiction education  - Assess for verbalization of changing behaviors related to substance abuse  - Initiate consults and referrals as appropriate (Case Management, Spiritual Care, etc )  Outcome: Progressing  Goal: By discharge, will develop insight into their chemical dependency and sustain motivation to continue in recovery  Description: INTERVENTIONS:  - Attends all daily group sessions and scheduled AA groups  - Actively practices coping skills through participation in the therapeutic community and adherence to program rules  - Reviews and completes assignments from individual treatment plan  - Assist patient development of understanding of their personal cycle of addiction and relapse triggers  Outcome: Progressing  Goal: By discharge, patient will have ongoing treatment plan addressing chemical dependency  Description: INTERVENTIONS:  - Assist patient with resources and/or appointments for ongoing recovery based living  Outcome: Progressing     Problem: INVOLUNTARY ADMIT  Goal: Will cooperate with staff recommendations and doctor's orders and will demonstrate appropriate behavior  Description: INTERVENTIONS:  - Treat underlying conditions and offer medication as ordered  - Educate regarding involuntary admission procedures and rules  - Utilize positive consistent limit setting strategies to support patient and staff safety  Outcome: Progressing     Problem: Ineffective Coping  Goal: Participates in unit activities  Description: Interventions:  - Provide therapeutic environment   - Provide required programming   - Redirect inappropriate behaviors   Outcome: Progressing     Problem: DISCHARGE PLANNING  Goal: Discharge to home or other facility with appropriate resources  Description: INTERVENTIONS:  - Identify barriers to discharge w/patient and caregiver  - Arrange for needed discharge resources and transportation as appropriate  - Identify discharge learning needs (meds, wound care, etc )  - Arrange for interpretive services to assist at discharge as needed  - Refer to Case Management Department for coordinating discharge planning if the patient needs post-hospital services based on physician/advanced practitioner order or complex needs related to functional status, cognitive ability, or social support system  Outcome: Progressing

## 2021-01-27 NOTE — TREATMENT TEAM
01/27/21 0851   Team Meeting   Meeting Type Daily Rounds   Team Members Present   Team Members Present Physician;Nurse;; Other (Discipline and Name)   Physician Team Member Dr LOAIZA   Nursing Team Member [facility] Floor Management Team Member Chepe Mcgee   Other (Discipline and Name) Martinez and residents   Patient/Family Present   Patient Present No   Patient's Family Present No     303, pt remains manic, grandiose, delusions and racing thoughts  Pt is not compliant with meds - eds over objection  Continue med management  Continue to monitor  Discharge to be determined

## 2021-01-27 NOTE — NURSING NOTE
Pt presents as resting in bed  Is pressured on approach, but denies current symptoms of depression, anxiety and agitation  Later observed agitated towards staff about meal discrepancy  Was out in dinner area mumbling "faggot" under breath which agitated another patient  Pt was informed of this and reprimanded that this would not be tolerated  Pt grunted in response

## 2021-01-27 NOTE — NURSING NOTE
Patient presents as manic, grandiose, with racing thoughts and delusions  Patient vocalized concern about storage unit which houses all of his personal belongings and he is afraid he will lose them  Then vocalized series of events from "a woman that was almost murdered that he saved" to persecutory paranoia about "police coming after him for exposing evidence on them"  Due to anxiety, patient was administered PRN Ativan  Medication was mildly effective  Patient continue to engage in delusional thought expression and racing thoughts  Poor concentration, poor eye contact  Patient lacks insight and continuously states to nurse that he "doesn't belong here " Currently contracts for safety, denies SI, hi and hallucinations

## 2021-01-27 NOTE — PROGRESS NOTES
Progress Note - Behavioral Health   Inés Toribio 54 y o  male MRN: [id number]  Unit/Bed#: U 344-02 Encounter: [id number]    Assessment/Plan   Principal Problem:    Schizoaffective disorder, bipolar type ([address] )  Active Problems:    Medical clearance for psychiatric admission      · Cross taper: Discontinue Aripiprazole 10 mg q h s & Start Zyprexa 15 mg q h s  for bipolar disorder and psychosis   · Medication over objection approved for Zyprexa IM 10 mg q h s  if Zyprexa P O  refused  · Continue to promote patient participation in therapeutic milieu  · Continue medical management by primary team   · Discharge disposition: [address] court hearing was upheld on 1/26/21    Subjective: The patient was evaluated this morning for continuity of care and no acute distress noted throughout the evaluation  Over the past 24 hours the patient continued to make grandiose statements, observed speaking to self, and remaining paranoid while refusing medication  Today on exam, Derek Morning reports being "upset" for being placed on the unit because he is "losing time" since he wants to win 2 more golf tournaments and get  before 72 yr old  Pt continues to be grandiose stating "my IQ is over 300," talking about his superior golfing abilities, and the job opportunities he has in [place]  Pt continuously talks about knowing information that the police want covered up  Pt states he was protecting a woman he planned to  from 2 drug dealers but she ended up being shot in the head and that "there are two people on the floor (unit) that know what I am talking about " Pt states he wants to clean the streets of any drug dealers and is ok if he dies doing it because they are corrupting the young adults  Pt does not believe in pills and states he has never taken medication because he has always been in good health  Pt states he has never needed or been admitted for psychiatric reasons   Currently, pt states he has been eating and sleeping "good " Pt denies any thoughts of suicidal or homicidal ideation, and denies any auditory or visual hallucinations         Current Medications:  Current Facility-Administered Medications   Medication Dose Route Frequency Provider Last Rate    acetaminophen  650 mg Oral Q6H PRN Marc C Holter, DO      acetaminophen  650 mg Oral Q4H PRN Marc C Holter, DO      acetaminophen  975 mg Oral Q6H PRN Marc C Holter, DO      aluminum-magnesium hydroxide-simethicone  30 mL Oral Q4H PRN Marc C Holter, DO      benztropine  1 mg Intramuscular Q1H PRN [place] C Holter, DO      benztropine  1 mg Oral Q1H PRN [place] C Holter, DO      hydrOXYzine HCL  25 mg Oral Q4H PRN Marc C Holter, DO      LORazepam  2 mg Intramuscular Q4H PRN [place] C Holter, DO      LORazepam  1 mg Oral Q4H PRN Marc C Holter, DO      magnesium hydroxide  30 mL Oral Daily PRN [place] C Holter, DO      nicotine  1 patch Transdermal Daily PRN SHAYNE Turner      OLANZapine  15 mg Oral HS Jacky Cowan MD      Or    OLANZapine  10 mg Intramuscular HS Jacky Cowan MD      OLANZapine  10 mg Intramuscular Q3H PRN [place] C Holter, DO      OLANZapine  10 mg Oral Q3H PRN Marc C Holter, DO      OLANZapine  5 mg Oral Q3H PRN [place] C Holter, DO      OLANZapine  7 5 mg Oral Q3H PRN [place] C Holter, DO      pantoprazole  20 mg Oral Daily SHAYNE Valentine      traZODone  50 mg Oral HS PRN [place] C Holter, DO         Behavioral Health Medications: all current active meds have been reviewed  Vital signs in last 24 hours:  Temp:  [97 9 °F (36 6 °C)-98 °F (36 7 °C)] 97 9 °F (36 6 °C)  HR:  [64-73] 71  Resp:  [16] 16  BP: (121-143)/(80-96) 121/80    Laboratory results:  I have personally reviewed all pertinent laboratory/tests results      Psychiatric Review of Systems:  Behavior over the last 24 hours:  unchanged  Sleep: normal  Appetite: normal  Medication side effects: No  ROS: no complaints    Mental Status Evaluation:  Appearance:  age appropriate, casually dressed and tattooed   Behavior:  psychomotor agitation, fair eye contact   Speech:  loud and pressured and hyperverbal   Mood:  irritable and "upset"   Affect:  increased in intensity   Language naming objects and repeating phrases   Thought Process:  tangential between topics yet perseverates   Thought Content:  Positive for Delusions of persecution and grandeur, Paranoia   Perceptual Disturbances: Denies auditory and visual hallucination   Risk Potential: Denies suicidal or homicial ideation   Sensorium:  person, place, time/date and situation   Cognition:  recent and remote memory grossly intact   Consciousness:  alert and awake    Attention: attention span and concentration were age appropriate   Insight:  poor   Judgment: poor   Intellect Appears to be of average intellegence   Gait/Station: normal gait/station and normal balance   Motor Activity: no abnormal movements     Memory: Short and long term memory  intact     Progress Toward Goals: Unchanged    Recommended Treatment:   See above for assessment and plan       Continue following current medications:   Current Facility-Administered Medications   Medication Dose Route Frequency Provider Last Rate    acetaminophen  650 mg Oral Q6H PRN [place] C Holter, DO      acetaminophen  650 mg Oral Q4H PRN Marc C Holter, DO      acetaminophen  975 mg Oral Q6H PRN Marc C Holter, DO      aluminum-magnesium hydroxide-simethicone  30 mL Oral Q4H PRN Marc C Holter, DO      benztropine  1 mg Intramuscular Q1H PRN [place] C Holter, DO      benztropine  1 mg Oral Q1H PRN [place] C Holter, DO      hydrOXYzine HCL  25 mg Oral Q4H PRN Marc C Holter, DO      LORazepam  2 mg Intramuscular Q4H PRN [place] C Holter, DO      LORazepam  1 mg Oral Q4H PRN Marc C Holter, DO      magnesium hydroxide  30 mL Oral Daily PRN [place] C Holter, DO      nicotine  1 patch Transdermal Daily PRN SHAYNE Mak      OLANZapine  15 mg Oral HS To Valle MD      Or    OLANZapine  10 mg Intramuscular HS To Valle MD      OLANZapine  10 mg Intramuscular Q3H PRN [place] C Holter, DO      OLANZapine  10 mg Oral Q3H PRN [place] C Holter, DO      OLANZapine  5 mg Oral Q3H PRN [place] C Holter, DO      OLANZapine  7 5 mg Oral Q3H PRN [place] C Holter, DO      pantoprazole  20 mg Oral Daily SHAYNE Valentine      traZODone  50 mg Oral HS PRN [place] C Holter, DO         Risks, benefits and possible side effects of Medications:   Risks, benefits, and possible side effects of medications explained to patient and patient verbalizes understanding  This note has been constructed using a voice recognition system  There may be translation, syntax, or grammatical errors  If you have any questions, please contact the dictating provider

## 2021-01-27 NOTE — NURSING NOTE
Patient out for breakfast  Observed talking to self while sitting in the dayroom  Remains pressured, tangential  Grandiose  Paranoid  Preoccupied with leaving the hospital because he needs to "find a wife"  " I need to get  before I die"

## 2021-01-28 PROCEDURE — 99232 SBSQ HOSP IP/OBS MODERATE 35: CPT | Performed by: PSYCHIATRY & NEUROLOGY

## 2021-01-28 RX ADMIN — OLANZAPINE 15 MG: 10 TABLET, ORALLY DISINTEGRATING ORAL at 21:24

## 2021-01-28 RX ADMIN — PANTOPRAZOLE SODIUM 20 MG: 20 TABLET, DELAYED RELEASE ORAL at 06:13

## 2021-01-28 NOTE — PROGRESS NOTES
Patient has been seclusive to his room the entire day  Only out for meals  Irritable edge during interaction   Remains paranoid, delusional  Tangential

## 2021-01-28 NOTE — PROGRESS NOTES
Met with Mark Watson with the goal of exploring his substance use especially methamphetamines since his UDS was positive  Unfortunately, patient was focused on being angry about the fact he has been hospitalized without his consent  That his girlfriend was murdered and he has information on others who put him into the hospital  He presents as if there is a conspiracy against him  When asked about his methamphetamine use he states he used it once; a friend gave it to him  Then he went on to tell me it helps when one is depressed  Then he reverted back to the topic of the conspiracy against him and if his family and friends new he was hospitalized they would get him out  Patient denies being delusional  He is focused on drug dealers and people who are after him  "They just want to get me off the street"  He thinks his medications are making him dumb  He is angry and chooses to isolate into his room  Therapist encouraged him to participate in the therapeutic activities  I told him that I would let his case-manager know he will sign JACQUIE for family and friends

## 2021-01-28 NOTE — PROGRESS NOTES
Progress Note - Behavioral Health   Walter Campos 54 y o  male MRN: [id number]  Unit/Bed#: Polina Blackwell 344-02 Encounter: [id number]      Subjective:  Patient was initially calm and cooperative during interview this morning  He reported feeling groggy and cloudy  He became irritable during questioning, stating that it was difficult for him to "answer questions like this in the morning"  He perseverated on this multiple times stating that he did know how to answer questions  Patient was re-evaluated later morning with attending psychiatrist and appeared and behaved similarly to presentations on previous days  He continued to be delusional regarding the [employer] as well as "a cover up going on"  Continues to be grandiose stating that he had never needed psychiatric medication and currently still does not require medication  He denies auditory or visual hallucinations  Denies suicidal or homicidal ideation  He is compliant with medications, reporting that the Zyprexa makes him feel cloudy  Reports that he was able to sleep last night and has been eating his meals      Current Medications:  Current Facility-Administered Medications   Medication Dose Route Frequency Provider Last Rate    acetaminophen  650 mg Oral Q6H PRN [place] C Holter, DO      acetaminophen  650 mg Oral Q4H PRN Marc C Holter, DO      acetaminophen  975 mg Oral Q6H PRN Marc C Holter, DO      aluminum-magnesium hydroxide-simethicone  30 mL Oral Q4H PRN Marc C Holter, DO      benztropine  1 mg Intramuscular Q1H PRN [place] C Holter, DO      benztropine  1 mg Oral Q1H PRN [place] C Holter, DO      hydrOXYzine HCL  25 mg Oral Q4H PRN Marc C Holter, DO      LORazepam  2 mg Intramuscular Q4H PRN [place] C Holter, DO      LORazepam  1 mg Oral Q4H PRN Marc C Holter, DO      magnesium hydroxide  30 mL Oral Daily PRN [place] C Holter, DO      nicotine  1 patch Transdermal Daily PRN SHAYNE Valentine      OLANZapine  15 mg Oral HS Tatum Smith MD      Or    OLANZapine  10 mg Intramuscular HS Tatum Smith MD      OLANZapine  10 mg Intramuscular Q3H PRN [place] C Holter, DO      OLANZapine  10 mg Oral Q3H PRN [place] C Holter, DO      OLANZapine  5 mg Oral Q3H PRN [place] C Holter, DO      OLANZapine  7 5 mg Oral Q3H PRN [place] C Holter, DO      pantoprazole  20 mg Oral Daily SHAYNE Valentine      traZODone  50 mg Oral HS PRN Marc C Holter, DO           Vital signs in last 24 hours:  Temp:  [98 °F (36 7 °C)-98 6 °F (37 °C)] 98 6 °F (37 °C)  HR:  [65-86] 65  Resp:  [16-18] 18  BP: (129-141)/(79-90) 129/79    Laboratory results:  I have personally reviewed all pertinent laboratory/tests results          Mental Status Evaluation:  Appearance:  appears stated age, casually dressed, disheveled, tattooed , sitting upright in chair and Has a mustache   Behavior:  cooperative, occasional eye contact and psychomotor agitation   Speech:  Increased rate, hyperverbal   Mood:  "Groggy, cloudy"   Affect:  Irritable   Thought Process:  Patient is tangential between topics, yet perseverates on the same ones   Thought Content:  Delusions of grandeur and persecution, paranoia   Perceptual Disturbances: Denies auditory or visual hallucinations and Does not appear to be responding to internal stimuli   Risk Potential: Denies suicidal or homicidal ideation   Sensorium:  person, place, time/date and situation   Cognition:  grossly intact   Consciousness:  alert and awake   Attention: attention span and concentration were age appropriate   Insight:  poor   Judgment: poor   Intellect appears to be of average intelligence   Gait/Station: normal gait/station   Motor Activity: no abnormal movements       Progress Toward Goals:  Unchanged      Assessment/Plan   Principal Problem:    Schizoaffective disorder, bipolar type ([facility] [address] )  Active Problems:    Medical clearance for psychiatric admission        Recommended Treatment:   -Continue Zydis 15 mg q h s    -IM Zyprexa 10 mg q h s  If patient refuses Zydis   -Continue with pharmacotherapy, group, milieu, and occupational therapy    Risks, benefits and possible side effects of Medications:   Risks, benefits, and possible side effects of medications explained to patient and patient verbalizes understanding  This note has been constructed using a voice recognition system  There may be translation, syntax,  or grammatical errors  If you have any questions, please contact the dictating provider  CHRISS Daniels    Psychiatry, PGY-2

## 2021-01-29 LAB
ATRIAL RATE: 66 BPM
P AXIS: 55 DEGREES
PR INTERVAL: 138 MS
QRS AXIS: -56 DEGREES
QRSD INTERVAL: 86 MS
QT INTERVAL: 374 MS
QTC INTERVAL: 392 MS
T WAVE AXIS: 35 DEGREES
VENTRICULAR RATE: 66 BPM

## 2021-01-29 PROCEDURE — 93010 ELECTROCARDIOGRAM REPORT: CPT | Performed by: INTERNAL MEDICINE

## 2021-01-29 PROCEDURE — 93005 ELECTROCARDIOGRAM TRACING: CPT

## 2021-01-29 PROCEDURE — 99232 SBSQ HOSP IP/OBS MODERATE 35: CPT | Performed by: PSYCHIATRY & NEUROLOGY

## 2021-01-29 RX ADMIN — OLANZAPINE 15 MG: 10 TABLET, ORALLY DISINTEGRATING ORAL at 21:06

## 2021-01-29 RX ADMIN — PANTOPRAZOLE SODIUM 20 MG: 20 TABLET, DELAYED RELEASE ORAL at 06:17

## 2021-01-29 NOTE — PROGRESS NOTES
Progress Note - Behavioral Health   [facility] 54 y o  male MRN: [id number]  Unit/Bed#: KHARI Williamson 344-02 Encounter: [id number]      Subjective:  Patient was cooperative with interview  He continues to be delusional and grandiose  Reports that he has information to "end someone's career and is the reason why he is currently hospitalized  Patient was perseverative  He stated that he has "evidence that can lead to conviction"  When asked what this evidence was, patient stated that the [place] police officers knew my family and Marco Antonio Carrizales established motive, doc  Patient was tangential during the interview between topics though perseverating on the fact that he wanted to get  and live in [facility]  He discussed this topic up multiple times during the interview  Patient denied current suicidal or homicidal ideation  States that he wants to be nice to everyone in here  Patient denies auditory or visual hallucinations  Denies feeling paranoid or that people are out to hurt him  He reports good sleep, states that he might sleep much more than other people  He is compliant with medications and denies experiencing adverse effects  He has been eating his meals      Current Medications:  Current Facility-Administered Medications   Medication Dose Route Frequency Provider Last Rate    acetaminophen  650 mg Oral Q6H PRN [place] C Holter, DO      acetaminophen  650 mg Oral Q4H PRN Marc C Holter, DO      acetaminophen  975 mg Oral Q6H PRN Marc C Holter, DO      aluminum-magnesium hydroxide-simethicone  30 mL Oral Q4H PRN Marc C Holter, DO      benztropine  1 mg Intramuscular Q1H PRN [place] C Holter, DO      benztropine  1 mg Oral Q1H PRN [place] C Holter, DO      hydrOXYzine HCL  25 mg Oral Q4H PRN Marc C Holter, DO      LORazepam  2 mg Intramuscular Q4H PRN [place] C Holter, DO      LORazepam  1 mg Oral Q4H PRN Marc C Holter, DO      magnesium hydroxide  30 mL Oral Daily PRN [place] C Holter, DO      nicotine  1 patch Transdermal Daily PRN OrSHAYNE Baugh      OLANZapine  15 mg Oral HS Rachel Cardona MD      Or    OLANZapine  10 mg Intramuscular HS Rachel Cardona MD      OLANZapine  10 mg Intramuscular Q3H PRN [place] C Holter, DO      OLANZapine  10 mg Oral Q3H PRN [place] C Holter, DO      OLANZapine  5 mg Oral Q3H PRN [place] C Holter, DO      OLANZapine  7 5 mg Oral Q3H PRN [place] C Holter, DO      pantoprazole  20 mg Oral Daily SHAYNE Valentine      traZODone  50 mg Oral HS PRN Marc C Holter, DO           Vital signs in last 24 hours:  HR:  [71] 71  Resp:  [16] 16  BP: (138)/(97) 138/97    Laboratory results:  I have personally reviewed all pertinent laboratory/tests results          Mental Status Evaluation:  Appearance:  appears stated age, casually dressed, disheveled, tattooed  and has a mustache   Behavior:  cooperative, occasional eye contact and psychomotor agitation   Speech:  increased rate, hyperverbal   Mood:  "fine"   Affect:  Increased intensity   Thought Process:  Patient is tangential between topics, yet perseverates on the same ones-especially about wanting to get  and moved to [address] [place] [address]:  delusions of grandeur and persecution, paranoia   Perceptual Disturbances: Denies auditory or visual hallucinations and Does not appear to be responding to internal stimuli   Risk Potential: Denies suicidal or homicidal ideation   Sensorium:  person, place, time/date and situation   Cognition:  grossly intact   Consciousness:  alert and awake   Attention: attention span and concentration were age appropriate   Insight:  poor   Judgment: limited   Intellect appears to be of average intelligence   Gait/Station: normal gait/station   Motor Activity: no abnormal movements       Progress Toward Goals:  Minimal progression      Assessment/Plan   Principal Problem:    Schizoaffective disorder, bipolar type [facility])  Active Problems:    Medical clearance for psychiatric admission        Recommended Treatment:   -Continue Zydis 15 mg q h s  with IM Zyprexa 10 mg q h s  alternative if patient refuses oral medication  -Continue with pharmacotherapy, group, milieu, and occupational therapy    Risks, benefits and possible side effects of Medications:   Risks, benefits, and possible side effects of medications explained to patient and patient verbalizes understanding  This note has been constructed using a voice recognition system  There may be translation, syntax,  or grammatical errors  If you have any questions, please contact the dictating provider  CHRISS Das    Psychiatry, PGY-2

## 2021-01-29 NOTE — NURSING NOTE
Pt compliant with po Zyprexa   After medication administration the pt was upset and stats" when I leave here, I will get a  and matthew everyone here " pt began talking about how he is not supposed to be here and he was defending his friend, when he was trying to explain to the police , the police forced him to come here  Pt apologizes to the writer at the end bout the way he was taking   Pt exclusive to his room during the evening    Will monitor

## 2021-01-29 NOTE — TREATMENT TEAM
01/29/21 0849   Team Meeting   Meeting Type Daily Rounds   Team Members Present   Team Members Present Physician;Nurse;; Other (Discipline and Name)   Physician Team Member Dr LOAIZA   Nursing Team Member [address] [facility] Management Team Member Riaz Trevino   Other (Discipline and Name) Martinez   Patient/Family Present   Patient Present No   Patient's Family Present No     303, pt remains delusional, grandiose and tangential -wants to go to [place] instead of [place]  Pt is compliant with Zyprexa po  Pt makes comments that he will matthew to the staff and hospital  Continue med management  Continue to monitor  Discharge to be determined

## 2021-01-29 NOTE — TREATMENT TEAM
01/28/21 0856   Team Meeting   Meeting Type Daily Rounds   Team Members Present   Team Members Present Physician;Nurse;; Other (Discipline and Name)   Physician Team Member Dr Chelita Thompson   Other (Discipline and Name) Martinez and residents   Patient/Family Present   Patient Present No   Patient's Family Present No     303, reports paranoid delusions about police  Meds over objections- pt is increasingly compliant with meds  Continue med management  Continue to monitor  Discharge to be determined

## 2021-01-29 NOTE — NURSING NOTE
Pt was pleasant upon approach  Pt focused on needed to be discharged so he can " look for a wife and put a ring on his finger "  Pt does not believe he needs to be in the hospital  Pt was hyper verbal and tangential   Pt denies SI/HI and AVH  Pt stated his sleep and appetite is good  Will continue to monitor

## 2021-01-29 NOTE — NURSING NOTE
Patient remains delusional, grandiose  Remains preoccupied with getting a wife  " I gotta get out of here and find a wife"  States that he has already lost out on millions of dollars being in the hospital  Questioning if he will be here "forever"  Continues to feel that he does not need to be here and said that it was some type of "prank" that he is here

## 2021-01-30 PROCEDURE — 99232 SBSQ HOSP IP/OBS MODERATE 35: CPT | Performed by: PSYCHIATRY & NEUROLOGY

## 2021-01-30 RX ADMIN — PANTOPRAZOLE SODIUM 20 MG: 20 TABLET, DELAYED RELEASE ORAL at 06:16

## 2021-01-30 RX ADMIN — OLANZAPINE 15 MG: 10 TABLET, ORALLY DISINTEGRATING ORAL at 21:11

## 2021-01-30 NOTE — NURSING NOTE
Pt pressured and rambling in conversation  Grandiose and paranoid  Talks about coming from a rich family  "56'ing me is like 56'ing a kid from 200 " Pt said the police came to his friend's house and tackled him to bring him to the hospital  Pt states nothing is wrong with him  Denies SI/HI  Denies depression and anxiety  Denies hallucinations  Focused on discharging from hospital  "I have phone numbers to very prominent people that can tell you I shouldn't be here " Plans to go to [facility] and is focused on finding a wife

## 2021-01-31 VITALS
WEIGHT: 158.6 LBS | BODY MASS INDEX: 24.04 KG/M2 | HEIGHT: 68 IN | DIASTOLIC BLOOD PRESSURE: 84 MMHG | HEART RATE: 65 BPM | TEMPERATURE: 97.9 F | RESPIRATION RATE: 16 BRPM | OXYGEN SATURATION: 98 % | SYSTOLIC BLOOD PRESSURE: 137 MMHG

## 2021-01-31 PROCEDURE — 99232 SBSQ HOSP IP/OBS MODERATE 35: CPT | Performed by: PSYCHIATRY & NEUROLOGY

## 2021-01-31 RX ORDER — OLANZAPINE 10 MG/1
10 TABLET ORAL
Status: CANCELLED | OUTPATIENT
Start: 2021-01-31

## 2021-01-31 RX ORDER — TRAZODONE HYDROCHLORIDE 50 MG/1
50 TABLET ORAL
Status: CANCELLED | OUTPATIENT
Start: 2021-01-31

## 2021-01-31 RX ORDER — OLANZAPINE 5 MG/1
5 TABLET ORAL
Status: CANCELLED | OUTPATIENT
Start: 2021-01-31

## 2021-01-31 RX ORDER — OLANZAPINE 10 MG/1
10 INJECTION, POWDER, LYOPHILIZED, FOR SOLUTION INTRAMUSCULAR
Status: CANCELLED | OUTPATIENT
Start: 2021-01-31

## 2021-01-31 RX ORDER — ACETAMINOPHEN 325 MG/1
975 TABLET ORAL EVERY 6 HOURS PRN
Status: CANCELLED | OUTPATIENT
Start: 2021-01-31

## 2021-01-31 RX ORDER — HYDROXYZINE HYDROCHLORIDE 25 MG/1
25 TABLET, FILM COATED ORAL EVERY 4 HOURS PRN
Status: CANCELLED | OUTPATIENT
Start: 2021-01-31

## 2021-01-31 RX ORDER — LORAZEPAM 2 MG/ML
2 INJECTION INTRAMUSCULAR EVERY 4 HOURS PRN
Status: CANCELLED | OUTPATIENT
Start: 2021-01-31

## 2021-01-31 RX ORDER — LORAZEPAM 1 MG/1
1 TABLET ORAL EVERY 4 HOURS PRN
Status: CANCELLED | OUTPATIENT
Start: 2021-01-31

## 2021-01-31 RX ORDER — ACETAMINOPHEN 325 MG/1
650 TABLET ORAL EVERY 4 HOURS PRN
Status: CANCELLED | OUTPATIENT
Start: 2021-01-31

## 2021-01-31 RX ORDER — MAGNESIUM HYDROXIDE/ALUMINUM HYDROXICE/SIMETHICONE 120; 1200; 1200 MG/30ML; MG/30ML; MG/30ML
30 SUSPENSION ORAL EVERY 4 HOURS PRN
Status: CANCELLED | OUTPATIENT
Start: 2021-01-31

## 2021-01-31 RX ORDER — ACETAMINOPHEN 325 MG/1
650 TABLET ORAL EVERY 6 HOURS PRN
Status: CANCELLED | OUTPATIENT
Start: 2021-01-31

## 2021-01-31 RX ORDER — PANTOPRAZOLE SODIUM 20 MG/1
20 TABLET, DELAYED RELEASE ORAL DAILY
Status: CANCELLED | OUTPATIENT
Start: 2021-02-01

## 2021-01-31 RX ORDER — BENZTROPINE MESYLATE 1 MG/ML
1 INJECTION INTRAMUSCULAR; INTRAVENOUS
Status: CANCELLED | OUTPATIENT
Start: 2021-01-31

## 2021-01-31 RX ORDER — NICOTINE 21 MG/24HR
1 PATCH, TRANSDERMAL 24 HOURS TRANSDERMAL DAILY PRN
Status: CANCELLED | OUTPATIENT
Start: 2021-01-31

## 2021-01-31 RX ORDER — BENZTROPINE MESYLATE 1 MG/1
1 TABLET ORAL
Status: CANCELLED | OUTPATIENT
Start: 2021-01-31

## 2021-01-31 RX ADMIN — OLANZAPINE 15 MG: 10 TABLET, ORALLY DISINTEGRATING ORAL at 21:02

## 2021-01-31 RX ADMIN — PANTOPRAZOLE SODIUM 20 MG: 20 TABLET, DELAYED RELEASE ORAL at 06:27

## 2021-01-31 NOTE — NURSING NOTE
Pt mostly isolative to room  Out of bed for meals  No agitated behaviors  Denies symptoms  Continues to lack insight into need for treatment

## 2021-01-31 NOTE — NURSING NOTE
Patient isolative to room throughout the evening  Denied any unmet needs  HS medication compliant  Will monitor

## 2021-01-31 NOTE — PROGRESS NOTES
Progress Note - Behavioral Health   Nataliya Sahu 54 y o  male MRN: @MRN   Unit/Bed#: [facility] 195-36 Encounter: [id number]       Report from staff regarding this patient received and discussed, and records reviewed prior to seeing this patient   Patient has continued to report his paranoid ideations but to less degree  Looking forward his moved to dirty which likely associated with his paranoid delusions  Still has symptoms of hypomania  This note was not shared with the patient due to reasonable likelihood of causing patient harm      Sleep: improved  Appetite: normal    Medication side effects:no complaints    Mental Status Evaluation:    Appearance:  dressed in hospital attire   Mood:  improved, dysphoric, anxious   Affect: constricted, labile    Speech:  hypertalkative   Thought Content:  paranoid ideation   Perceptual Disturbances: no auditory hallucinations, no visual hallucinations, denies auditory hallucinations when asked, does not appear responding to internal stimuli   Risk Potential: Suicidal ideation - None, contracts for safety on the unit  Homicidal ideation - None  Potential for aggression - No   Insight:  impaired   Judgment: improving and impaired   Motor Activity: no abnormal movements         Laboratory results:  I have personally reviewed all pertinent laboratory results  Assessment and plan: Will not make medication adjustment  The patient medications recently adjusted and there was the 1st sign of improvement  Supportive therapy was provided  ** Please Note: This note has been constructed using a voice recognition system  **      BMP: No results for input(s): NA, K, CL, CO2, BUN in the last 72 hours      Invalid input(s): CREA, GLU  Vitals:    01/30/21 1611   BP: 148/86   Pulse: 79   Resp: 16   Temp: (!) 97 1 °F (36 2 °C)   SpO2:         Medication Administration - last 24 hours from 01/29/2021 2140 to 01/30/2021 2140       Date/Time Order Dose Route Action Action by     01/30/2021 7814 pantoprazole (PROTONIX) EC tablet 20 mg 20 mg Oral Given Rai Chua RN     01/30/2021 2111 OLANZapine (ZyPREXA ZYDIS) dispersible tablet 15 mg 15 mg Oral Given Verona Clark RN     01/30/2021 2111 OLANZapine (ZyPREXA) IM injection 10 mg   Intramuscular See Alternative Verona Clark RN

## 2021-02-01 ENCOUNTER — HOSPITAL ENCOUNTER (INPATIENT)
Facility: HOSPITAL | Age: 56
LOS: 11 days | Discharge: HOME | DRG: 750 | End: 2021-02-12
Attending: PSYCHIATRY & NEUROLOGY | Admitting: PSYCHIATRY & NEUROLOGY
Payer: COMMERCIAL

## 2021-02-01 DIAGNOSIS — F20.0 PARANOID SCHIZOPHRENIA (HCC): Primary | ICD-10-CM

## 2021-02-01 DIAGNOSIS — K21.9 GERD (GASTROESOPHAGEAL REFLUX DISEASE): ICD-10-CM

## 2021-02-01 DIAGNOSIS — F17.210 NICOTINE DEPENDENCE, CIGARETTES, UNCOMPLICATED: ICD-10-CM

## 2021-02-01 PROCEDURE — 99232 SBSQ HOSP IP/OBS MODERATE 35: CPT | Performed by: STUDENT IN AN ORGANIZED HEALTH CARE EDUCATION/TRAINING PROGRAM

## 2021-02-01 RX ORDER — OLANZAPINE 10 MG/1
10 INJECTION, POWDER, LYOPHILIZED, FOR SOLUTION INTRAMUSCULAR
Status: DISCONTINUED | OUTPATIENT
Start: 2021-02-01 | End: 2021-02-01

## 2021-02-01 RX ORDER — OLANZAPINE 10 MG/1
10 INJECTION, POWDER, LYOPHILIZED, FOR SOLUTION INTRAMUSCULAR
Status: DISCONTINUED | OUTPATIENT
Start: 2021-02-01 | End: 2021-02-05

## 2021-02-01 RX ORDER — ACETAMINOPHEN 325 MG/1
650 TABLET ORAL EVERY 4 HOURS PRN
Status: DISCONTINUED | OUTPATIENT
Start: 2021-02-01 | End: 2021-02-11

## 2021-02-01 RX ORDER — OLANZAPINE 5 MG/1
5 TABLET ORAL
Status: DISCONTINUED | OUTPATIENT
Start: 2021-02-01 | End: 2021-02-12 | Stop reason: HOSPADM

## 2021-02-01 RX ORDER — TRAZODONE HYDROCHLORIDE 50 MG/1
50 TABLET ORAL
Status: DISCONTINUED | OUTPATIENT
Start: 2021-02-01 | End: 2021-02-12 | Stop reason: HOSPADM

## 2021-02-01 RX ORDER — NICOTINE 21 MG/24HR
1 PATCH, TRANSDERMAL 24 HOURS TRANSDERMAL DAILY PRN
Status: DISCONTINUED | OUTPATIENT
Start: 2021-02-01 | End: 2021-02-12 | Stop reason: HOSPADM

## 2021-02-01 RX ORDER — LORAZEPAM 1 MG/1
1 TABLET ORAL EVERY 4 HOURS PRN
Status: DISCONTINUED | OUTPATIENT
Start: 2021-02-01 | End: 2021-02-12 | Stop reason: HOSPADM

## 2021-02-01 RX ORDER — BENZTROPINE MESYLATE 1 MG/ML
1 INJECTION INTRAMUSCULAR; INTRAVENOUS
Status: DISCONTINUED | OUTPATIENT
Start: 2021-02-01 | End: 2021-02-12 | Stop reason: HOSPADM

## 2021-02-01 RX ORDER — OLANZAPINE 10 MG/1
10 TABLET ORAL
Status: DISCONTINUED | OUTPATIENT
Start: 2021-02-01 | End: 2021-02-12 | Stop reason: HOSPADM

## 2021-02-01 RX ORDER — BENZTROPINE MESYLATE 1 MG/1
1 TABLET ORAL
Status: DISCONTINUED | OUTPATIENT
Start: 2021-02-01 | End: 2021-02-12 | Stop reason: HOSPADM

## 2021-02-01 RX ORDER — MAGNESIUM HYDROXIDE/ALUMINUM HYDROXICE/SIMETHICONE 120; 1200; 1200 MG/30ML; MG/30ML; MG/30ML
30 SUSPENSION ORAL EVERY 4 HOURS PRN
Status: DISCONTINUED | OUTPATIENT
Start: 2021-02-01 | End: 2021-02-12 | Stop reason: HOSPADM

## 2021-02-01 RX ORDER — OLANZAPINE 10 MG/1
10 INJECTION, POWDER, LYOPHILIZED, FOR SOLUTION INTRAMUSCULAR
Status: DISCONTINUED | OUTPATIENT
Start: 2021-02-01 | End: 2021-02-12 | Stop reason: HOSPADM

## 2021-02-01 RX ORDER — PANTOPRAZOLE SODIUM 20 MG/1
20 TABLET, DELAYED RELEASE ORAL DAILY
Status: DISCONTINUED | OUTPATIENT
Start: 2021-02-01 | End: 2021-02-12 | Stop reason: HOSPADM

## 2021-02-01 RX ORDER — LORAZEPAM 2 MG/ML
2 INJECTION INTRAMUSCULAR EVERY 4 HOURS PRN
Status: DISCONTINUED | OUTPATIENT
Start: 2021-02-01 | End: 2021-02-12 | Stop reason: HOSPADM

## 2021-02-01 RX ORDER — ACETAMINOPHEN 325 MG/1
975 TABLET ORAL EVERY 6 HOURS PRN
Status: DISCONTINUED | OUTPATIENT
Start: 2021-02-01 | End: 2021-02-11

## 2021-02-01 RX ORDER — ACETAMINOPHEN 325 MG/1
650 TABLET ORAL EVERY 6 HOURS PRN
Status: DISCONTINUED | OUTPATIENT
Start: 2021-02-01 | End: 2021-02-11

## 2021-02-01 RX ORDER — HYDROXYZINE HYDROCHLORIDE 25 MG/1
25 TABLET, FILM COATED ORAL EVERY 4 HOURS PRN
Status: DISCONTINUED | OUTPATIENT
Start: 2021-02-01 | End: 2021-02-12 | Stop reason: HOSPADM

## 2021-02-01 RX ORDER — OLANZAPINE 10 MG/1
20 TABLET, ORALLY DISINTEGRATING ORAL
Status: DISCONTINUED | OUTPATIENT
Start: 2021-02-01 | End: 2021-02-05

## 2021-02-01 RX ADMIN — PANTOPRAZOLE SODIUM 20 MG: 20 TABLET, DELAYED RELEASE ORAL at 07:03

## 2021-02-01 RX ADMIN — OLANZAPINE 20 MG: 10 TABLET, ORALLY DISINTEGRATING ORAL at 21:45

## 2021-02-01 NOTE — NURSING NOTE
Patient cooperative and pleasant on approach  He denies all psych symptoms to this nurse  He comes out for meal and has no unmet needs  Patient was overheard speaking to the CM where he appeared to be agitated  He seems to lack insight into why he is he is here and does not feel he should be here at all  Patient mostly isolative to self

## 2021-02-01 NOTE — PROGRESS NOTES
Progress Note - Behavioral Health   Aracelis Wu 54 y o  male MRN: [id number]  Unit/Bed#: [facility] 344-02 Encounter: [id number]    All documentation, nursing notes, labs, and vitals reviewed  The patient's medication reconciliation chart was also analyzed for medication adherence  I personally evaluated Aracelis Wu and discussed current care with treatment team   No acute events overnight  On approach, Sherine Broderick is resting peacefully in his bed  He was receptive to psychiatric evaluation  Sherine Broderick is notably restless, tangential, and delusional in his thought process  He exhibits objective evidence of grandiosity and speaks at length about how affluent his family was and 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"  When questioned about the events that preceded his hospitalization, Sherine Broderick becomes more disorganized and bizarre  He speaks at length about extensive evidence for indictment regarding [place] police officers and persecutory delusions surrounding [employer]  Sherine Broderick harbors this fixed, false belief that both police and physicians falsified paperwork" to have him institutionalized as a means of protecting the rest of the law enforcement  Sherine Broderick is disjointed and illogical at times  In a braggadocios manner, he speaks to both acquiring and losing 1 million dollars and plans he has to get  in the future  Sherine Broderick reluctantly remains compliant with current psychotropic medication regimen  He denies adverse medication side effects  He states both his sleep and appetite are satisfactory  His energy and motivation are limited  He continues to lack significant insight into his disease process and states that he will not continue current psychotropic medication regimen upon discharge, stating that I do not need this stuff Lloyd Dunne currently denies acute thoughts of suicide or self-harm  He has no plans to harm others on the unit  He denies new onset anhedonia and minimizes his substance use    Sherine Broderick remains disinhibited, labile, and endorses racing thoughts  He requires further optimization of Zyprexa  Team will likely initiate treatment with mood stabilizing agents as his hospital course progresses  Diagnostic labs are within normal limits and reveal no contraindications to mood stabilizers  He offers no further complaints  Mental Status Evaluation:    Appearance:  age appropriate, adequate grooming, looks stated age   Behavior:  agitated, demanding, guarded, restless, evasive   Speech:  increased rate, pressured, hypertalkative   Mood:  labile, irritable   Affect:  reactive, labile   Thought Process:  disorganized, illogical, tangential, increased rate of thoughts, racing of thoughts   Associations: tangential associations, perseverative   Thought Content:  persecutory delusions, paranoid ideation, grandiose, ruminating thoughts   Perceptual Disturbances: no auditory hallucinations, no visual hallucinations   Risk Potential: Suicidal ideation - None at present  Homicidal ideation - None at present  Potential for aggression - Yes, due to acute psychosis   Sensorium:  oriented to person, place and time/date   Memory:  recent and remote memory grossly intact   Consciousness:  alert and awake    Attention: attention span and concentration are age appropriate   Insight:  impaired   Judgment: impaired   Gait/Station: normal gait/station   Motor Activity: no abnormal movements       Assessment:     Principal Problem:    Schizoaffective disorder, bipolar type ([facility] )      Plan/Recommended Treatment:     - Continue with pharmacotherapy, group therapy, milieu therapy and occupational therapy      - Risks/benefits/alternatives to treatment discussed and Alfredo Kunz continues to verbalize understanding   - Medication-over-objection   - Increase Zyprexa 15mg QHS to 20mg QHS  - Consider use of mood stabilizing agents if dusty does not remit with further optimization of Zyprexa   - Will consider further optimization of psychotropic medication regimen as hospital course progresses   - Continue to assess for adverse medication side effects   - Encourage Marielena Guerrero to participate in nonverbal forms of therapy including journaling and art/music therapy  - Continue precautionary Q7-minute safety checks  - Continue to engage case management/SW to assist with collateral information, discharge planning, and the implementation of an individualized, patient-centered plan of care    - The patient will be maintained on the following medications:    Current Facility-Administered Medications   Medication Dose Route Frequency Provider Last Rate    acetaminophen  650 mg Oral Q6H PRRAYO Mckay MD      acetaminophen  650 mg Oral Q4H PRRAYO Mckay MD      acetaminophen  975 mg Oral Q6H PRN Barry Mckay MD      aluminum-magnesium hydroxide-simethicone  30 mL Oral Q4H PRRAYO Mckay MD      benztropine  1 mg Intramuscular Q1H PRRAYO Mckay MD      benztropine  1 mg Oral Q1H PRRAYO Mckay MD      hydrOXYzine HCL  25 mg Oral Q4H PRRAYO Mckay MD      LORazepam  2 mg Intramuscular Q4H PRRAYO Mckay MD      LORazepam  1 mg Oral Q4H PRRAYO Mckay MD      magnesium hydroxide  30 mL Oral Daily PRN Barry Mckay MD      nicotine  1 patch Transdermal Daily PRRAYO Mckay MD      OLANZapine  20 mg Oral HS Aminah Barragan MD      Or   Ira Ronquillo OLANZapine  10 mg Intramuscular HS Aminah Barragan MD      OLANZapine  10 mg Intramuscular Q3H PRRAYO Mckay MD      OLANZapine  10 mg Oral Q3H PRRAYO Mckay MD      OLANZapine  5 mg Oral Q3H PRRAYO Mckay MD      OLANZapine  7 5 mg Oral Q3H PRRAYO Mckay MD      pantoprazole  20 mg Oral Daily Barry Mckay MD      traZODone  50 mg Oral HS PRRAYO Mckay MD

## 2021-02-01 NOTE — PROGRESS NOTES
Progress Note - Behavioral Health   Macario Bocanegra 54 y o  male MRN: @MRN   Unit/Bed#: [facility] 584-46 Encounter: [id number]       Report from staff regarding this patient received and discussed, and records reviewed prior to seeing this patient   Patient's is less paranoid  No longer believes that [facility] is trying to the force him into any unacceptable for the patient behaviors  More positive about future  Nevertheless paranoid ideations likely remain  Although to a much less degree    Sleep: improved  Appetite: normal    Medication side effects:no complaints    Mental Status Evaluation:        Appearance:  dressed appropriately, casually dressed   Mood:  improved, depressed, anxious   Affect: reactive, brighter    Speech:  normal rate and volume, normal pitch   Thought Content:  paranoid ideation,  resolving   Perceptual Disturbances: no auditory hallucinations, no visual hallucinations, denies auditory hallucinations when asked, does not appear responding to internal stimuli   Risk Potential: Suicidal ideation - None, contracts for safety on the unit  Homicidal ideation - None  Potential for aggression - No   Insight:  improving   Judgment: improving   Motor Activity: no abnormal movements         Laboratory results:  I have personally reviewed all pertinent laboratory results  Assessment and plan: Will continue medication management patients with improving symptoms of his manic psychosis  ** Please Note: This note has been constructed using a voice recognition system  **      BMP: No results for input(s): NA, K, CL, CO2, BUN in the last 72 hours      Invalid input(s): CREA, GLU  Vitals:    01/31/21 1500   BP: 137/84   Pulse: 65   Resp: 16   Temp: 97 9 °F (36 6 °C)   SpO2:         Medication Administration - last 24 hours from 01/30/2021 2312 to 01/31/2021 2312       Date/Time Order Dose Route Action Action by     01/31/2021 0627 pantoprazole (PROTONIX) EC tablet 20 mg 20 mg Oral Given Ivonne Howard RN     01/31/2021 2102 OLANZapine (ZyPREXA ZYDIS) dispersible tablet 15 mg 15 mg Oral Given Ivonen Howard RN     01/31/2021 2102 OLANZapine (ZyPREXA) IM injection 10 mg   Intramuscular See Alternative Ivonne Howard RN

## 2021-02-01 NOTE — PLAN OF CARE
Problem: DEPRESSION  Goal: Will be euthymic at discharge  Description: INTERVENTIONS:  - Administer medication as ordered  - Provide emotional support via 1:1 interaction with staff  - Encourage involvement in milieu/groups/activities  - Monitor for social isolation  Outcome: Progressing     Problem: PSYCHOSIS  Goal: Will report no hallucinations or delusions  Description: Interventions:  - Administer medication as  ordered  - Every waking shifts and PRN assess for the presence of hallucinations and or delusions  - Assist with reality testing to support increasing orientation  - Assess if patient's hallucinations or delusions are encouraging self-harm or harm to others and intervene as appropriate  Outcome: Progressing     Problem: SUBSTANCE USE/ABUSE  Goal: Will have no detox symptoms and will verbalize plan for changing substance-related behavior  Description: INTERVENTIONS:  - Monitor physical status and assess for symptoms of withdrawal  - Administer medication as ordered  - Provide emotional support with 1 on 1 interaction with staff  - Encourage recovery focused program/ addiction education  - Assess for verbalization of changing behaviors related to substance abuse  - Initiate consults and referrals as appropriate (Case Management, Spiritual Care, etc )  Outcome: Progressing  Goal: By discharge, will develop insight into their chemical dependency and sustain motivation to continue in recovery  Description: INTERVENTIONS:  - Attends all daily group sessions and scheduled AA groups  - Actively practices coping skills through participation in the therapeutic community and adherence to program rules  - Reviews and completes assignments from individual treatment plan  - Assist patient development of understanding of their personal cycle of addiction and relapse triggers  Outcome: Progressing  Goal: By discharge, patient will have ongoing treatment plan addressing chemical dependency  Description: INTERVENTIONS:  - Assist patient with resources and/or appointments for ongoing recovery based living  Outcome: Progressing     Problem: INVOLUNTARY ADMIT  Goal: Will cooperate with staff recommendations and doctor's orders and will demonstrate appropriate behavior  Description: INTERVENTIONS:  - Treat underlying conditions and offer medication as ordered  - Educate regarding involuntary admission procedures and rules  - Utilize positive consistent limit setting strategies to support patient and staff safety  Outcome: Progressing

## 2021-02-01 NOTE — NURSING NOTE
Patient remained isolative to room and self throughout the night  Easy to wake for HS medications and was compliant without any difficulty  Refused HS snack  Denied any unmet needs  Will monitor

## 2021-02-01 NOTE — PROGRESS NOTES
02/01/21 0906   Team Meeting   Meeting Type Daily Rounds   Team Members Present   Team Members Present Nurse;;Physician   Physician Team Member Dr Ashley Oviedo Team Member CLARK FORTE [facility] Management Team Member Gumaro   Patient/Family Present   Patient Present No   Patient's Family Present No   Pt isolative to his room  Pt compliant with medications  Pt with no agitation behaviors  Discharge to be determined

## 2021-02-01 NOTE — CASE MANAGEMENT
303, CM met with patient in room  Pt presents calm and cooperative  Pt denied having SI, HI and AVH  CM discussed with patient about prior MH history so the medical records can be requested for review  Pt denied having any prior MH treatment  Pt grandiose about family having lot of money and him being the only beneficiary  Pt delusional "It's a coup against me  The police want me to stay in hospital for what I don't even know"  Pt remains preoccupied about getting  to a woman as he insists to get out "As soon as I get out of here, I will find a beautiful woman, fall in love, get  and live rest of my life with her  There's nothing more in life than falling in love with a beautiful woman"  Pt has poor insights into his treatment  Continue to monitor

## 2021-02-02 PROCEDURE — 99232 SBSQ HOSP IP/OBS MODERATE 35: CPT | Performed by: STUDENT IN AN ORGANIZED HEALTH CARE EDUCATION/TRAINING PROGRAM

## 2021-02-02 RX ORDER — DIVALPROEX SODIUM 500 MG/1
1000 TABLET, EXTENDED RELEASE ORAL
Status: DISCONTINUED | OUTPATIENT
Start: 2021-02-02 | End: 2021-02-04

## 2021-02-02 RX ADMIN — OLANZAPINE 20 MG: 10 TABLET, ORALLY DISINTEGRATING ORAL at 21:24

## 2021-02-02 RX ADMIN — PANTOPRAZOLE SODIUM 20 MG: 20 TABLET, DELAYED RELEASE ORAL at 06:27

## 2021-02-02 RX ADMIN — DIVALPROEX SODIUM 1000 MG: 500 TABLET, EXTENDED RELEASE ORAL at 21:24

## 2021-02-02 NOTE — NURSING NOTE
Patient was isolative to room for most of the evening  HS medication compliant  Denied any unmet needs  Will monitor

## 2021-02-02 NOTE — PROGRESS NOTES
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 and reasons as to why [place] is not crazy  On assessment today, He Hooper remains tangential in thought with evidence of loose associations  He exhibits grandiosity and is psychomotorically restless  He Hooper states that when his father passed away he inherited "everything [address]"  He speaks to ownership of a "26 room [address] [place] [address], an apartment complex with 8 bedrooms, and a supermarket"  He Hooper also states that prior to his admission, he purchased numerous cars and "all sorts of stuff" with a $200,000 check he was given after he sold one of his properties  When questioned about his substance abuse, he minimizes this  He Hooper continues to endorse paranoia, suspiciousness, and persecutory delusions  He speaks at length about police officers in [facility] today and how he believes on particular individual ("officer Shivamandre Pierre") is working alongside others in the community to steal his unemployment benefits  He states that since he has been admitted to the hospital, people have been going to his house and [place] his money and stealing his identity"  When encouraged to utilize the phone and speak to the unemployment office, He Hooper laughs in a sardonic fashion and states that he has 30 friends in the [employer] who will fix it  Cruz tolerated the increased dose of Zyprexa well last evening and denies any adverse medication side effects    He states that his sleep was both adequate and restorative  His appetite remains robust   Vi Pierce denies any acute thoughts of suicide or self-harm  He offers future orientation and states that he is eagerly anticipating the upcoming golf season  Vi Pierce denies new onset anhedonia or pervasive feelings of worthlessness, hopelessness, or guilt  He continues to exhibit objective evidence of mendez psychosis and dusty  As such, we will initiate treatment with Depakote this evening  Vi Pierce will be given a loading dose of 1000 mg q h s  which will be optimized over the next few days  Recent diagnostic lab work reveals no contraindications as Cruz's hepatic efficiency is adequate  As mentioned previously, Vi Pierce continues to endorse delusional beliefs and paranoia but he denies overt auditory/visual hallucinations  He offers no further complaints      Mental Status Evaluation:    Appearance:  disheveled, marginal hygiene, looks stated age   Behavior:  restless and fidgety, evasive, sarcastic   Speech:  increased rate, pressured, hypertalkative   Mood:  manic, less irritable   Affect:  reactive, labile   Thought Process:  tangential, increased rate of thoughts, racing of thoughts, perseverative   Associations: loose associations   Thought Content:  persecutory delusions, racing thoughts, paranoid ideation, ruminating thoughts   Perceptual Disturbances: no auditory hallucinations, no visual hallucinations, does not appear responding to internal stimuli   Risk Potential: Suicidal ideation - None at present  Homicidal ideation - None at present  Potential for aggression - No   Sensorium:  oriented to person, place and time/date   Memory:  recent and remote memory grossly intact   Consciousness:  alert and awake    Attention: attention span and concentration are age appropriate   Insight:  impaired   Judgment: impaired   Gait/Station: normal gait/station   Motor Activity: no abnormal movements       Assessment:     Principal Problem:    Schizoaffective disorder, bipolar type [facility])      Plan/Recommended Treatment:     - Continue with pharmacotherapy, group therapy, milieu therapy and occupational therapy  - Risks/benefits/alternatives to treatment discussed and Kin Hurtado continues to verbalize understanding   - Continue Zyprexa 20mg QHS (increased yesterday)  - Start Depakote ER 1000mg QHS given severity of dusty   - Optimize over the next few days   - Will consider further optimization of psychotropic medication regimen as hospital course progresses   - Continue to assess for adverse medication side effects   - Encourage Kin Hurtado to participate in nonverbal forms of therapy including journaling and art/music therapy  - Continue precautionary Q7-minute safety checks  - Continue to engage case management/SW to assist with collateral information, discharge planning, and the implementation of an individualized, patient-centered plan of care    - The patient will be maintained on the following medications:    Current Facility-Administered Medications   Medication Dose Route Frequency Provider Last Rate    acetaminophen  650 mg Oral Q6H PRN Ginette Cornea, MD      acetaminophen  650 mg Oral Q4H PRN Ginette Andersen, MD      acetaminophen  975 mg Oral Q6H PRN Ginette Cornea, MD      aluminum-magnesium hydroxide-simethicone  30 mL Oral Q4H PRN Ginette Cornea, MD      benztropine  1 mg Intramuscular Q1H PRN Ginette Cornea, MD      benztropine  1 mg Oral Q1H PRN Ginette Cornea, MD      divalproex sodium  1,000 mg Oral HS Colby Bonilla MD      hydrOXYzine HCL  25 mg Oral Q4H PRN Ginette Cornea, MD      LORazepam  2 mg Intramuscular Q4H PRN Ginette Cornea, MD      LORazepam  1 mg Oral Q4H PRN Ginette Cornea, MD      magnesium hydroxide  30 mL Oral Daily PRN Ginette Cornea, MD      nicotine  1 patch Transdermal Daily PRN Ginette Cornea, MD      OLANZapine  20 mg Oral HS Colby Bonilla MD      Or    OLANZapine  10 mg Intramuscular HS Julienne Bonilla, MD      OLANZapine  10 mg Intramuscular Q3H PRN Patricia Stagers, MD      OLANZapine  10 mg Oral Q3H PRN Patricia Stagers, MD      OLANZapine  5 mg Oral Q3H PRN Patricia Stagers, MD      OLANZapine  7 5 mg Oral Q3H PRN Patricia Stagers, MD      pantoprazole  20 mg Oral Daily Patricia Stagers, MD      traZODone  50 mg Oral HS PRN Patricia Stagers, MD

## 2021-02-02 NOTE — NURSING NOTE
Patient pleasant and cooperative throughout the shift  Patient denies depression, anxiety, S/HI, and A/VH  Patient mostly isolative to himself  He still lacks insight into his mental health

## 2021-02-02 NOTE — NURSING NOTE
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

## 2021-02-03 PROCEDURE — 99232 SBSQ HOSP IP/OBS MODERATE 35: CPT | Performed by: STUDENT IN AN ORGANIZED HEALTH CARE EDUCATION/TRAINING PROGRAM

## 2021-02-03 RX ADMIN — DIVALPROEX SODIUM 1000 MG: 500 TABLET, EXTENDED RELEASE ORAL at 21:31

## 2021-02-03 RX ADMIN — OLANZAPINE 20 MG: 10 TABLET, ORALLY DISINTEGRATING ORAL at 21:31

## 2021-02-03 RX ADMIN — PANTOPRAZOLE SODIUM 20 MG: 20 TABLET, DELAYED RELEASE ORAL at 06:33

## 2021-02-03 NOTE — NURSING NOTE
Pt has been isolative to his room, med/meal compliant  Pleasant and cooperative when approached   Will monitor

## 2021-02-03 NOTE — PROGRESS NOTES
Progress Note - Behavioral Health   Macario Bocanegra 54 y o  male MRN: [id number]  Unit/Bed#: [facility] 709-54 Encounter: [id number]      Subjective:  Patient was seen for continuing care and reviewed with the treatment team   Patient was cooperative with interview this morning  Initially was somewhat irritable and perseverative about having been startled though self redirected sooner than he previously would  He reports adequate sleep and appetite  He reported a complaint of bilateral hand tremor at the start of the interview which remitted during interview was not apparent at the end  He is seclusive to his room stating that he wants to sleep the days away  He reports his mood as okay  Patient is future and goal oriented  States that he wants to get a [employer] job once he is discharged  He would like to live in [place], [place] and be  by the age of 61  Patient did not spontaneously verbalize delusions as he has in previous interviews  When asked about the delusions of the brothers in the [place] police department, patient stated that he put all that behind" him  He denied suicidal or homicidal ideation       Current Medications:  Current Facility-Administered Medications   Medication Dose Route Frequency Provider Last Rate    acetaminophen  650 mg Oral Q6H PRN Kristin Bagley MD      acetaminophen  650 mg Oral Q4H PRN Kristin Bagley MD      acetaminophen  975 mg Oral Q6H PRN Kristin Bagley MD      aluminum-magnesium hydroxide-simethicone  30 mL Oral Q4H PRN Kristin Bagley MD      benztropine  1 mg Intramuscular Q1H PRN Kristin Bagley MD      benztropine  1 mg Oral Q1H PRN Kristin Bagley MD      divalproex sodium  1,000 mg Oral HS Connie Painting MD      hydrOXYzine HCL  25 mg Oral Q4H PRN Kristin Bagley MD      LORazepam  2 mg Intramuscular Q4H PRN Kristin Bagley MD      LORazepam  1 mg Oral Q4H PRN Kristin Bagley MD      magnesium hydroxide  30 mL Oral Daily PRN Bruno Foote MD      nicotine  1 patch Transdermal Daily PRN Bruno Foote MD      OLANZapine  20 mg Oral HS Joe Angel MD      Or   Bonnie Ecsalante OLANZapine  10 mg Intramuscular HS Joe Angel MD      OLANZapine  10 mg Intramuscular Q3H PRN Bruno Foote MD      OLANZapine  10 mg Oral Q3H PRN Bruno Foote MD      OLANZapine  5 mg Oral Q3H PRN Bruno Foote MD      OLANZapine  7 5 mg Oral Q3H PRN Bruno Foote MD      pantoprazole  20 mg Oral Daily Bruno Foote MD      traZODone  50 mg Oral HS PRN Bruno Foote MD           Vital signs in last 24 hours:  Temp:  [98 2 °F (36 8 °C)-98 3 °F (36 8 °C)] 98 3 °F (36 8 °C)  HR:  [79-80] 79  Resp:  [16] 16  BP: (131-146)/(84-85) 146/85    Laboratory results:  I have personally reviewed all pertinent laboratory/tests results  Mental Status Evaluation:  Appearance:  appears stated age, casually dressed and has mustache   Behavior:  calm, cooperative, good eye contact and less restless   Speech:  increased rate, less pressured   Mood:  "okay"   Affect:  Increased intensity   Thought Process:  less tangiential and perseverative   Goal and future oriented   Thought Content:  no delusions elicited   Perceptual Disturbances: Denies auditory or visual hallucinations and Does not appear to be responding to internal stimuli   Risk Potential: Denies suicidal or homicidal ideation   Sensorium:  person, place, time/date and situation   Cognition:  grossly intact   Consciousness:  alert and awake   Attention: attention span and concentration were age appropriate   Insight:  improving   Judgment: improving   Intellect appears to be of average intelligence   Gait/Station: not assessed   Motor Activity: slight b/l hand tremor improved during interview       Progress Toward Goals: progressing      Assessment/Plan   Principal Problem:    Schizoaffective disorder, bipolar type ([facility] [address] )        Recommended Treatment:   -Continue current medications  -Continue with pharmacotherapy, group, milieu, and occupational therapy    Risks, benefits and possible side effects of Medications:   Risks, benefits, and possible side effects of medications explained to patient and patient verbalizes understanding  This note has been constructed using a voice recognition system  There may be translation, syntax,  or grammatical errors  If you have any questions, please contact the dictating provider  HCRISS Lawrence    Psychiatry, PGY-2

## 2021-02-03 NOTE — NURSING NOTE
Patient has been seclusive to his room most of the shift  Remains grandiose and pressured  Remains preoccupied with discharge  Patient said that he needs to get out of the hospital because he only has "5 more years" until he can get   " When I leave here I am going to  my car and head to [place]"  Darlen Spatz that he will then get a job at [employer] or [employer]  Continues to feel that he does not need medications  Says that he does not like how they are making him feel

## 2021-02-03 NOTE — TREATMENT TEAM
02/03/21 0849   Team Meeting   Meeting Type Daily Rounds   Team Members Present   Team Members Present Physician;Nurse;; Other (Discipline and Name)   Physician Team Member Dr Michele Byrd and CLARK FORTE [facility] Management Team Member Pinky Little   Other (Discipline and Name) Martienz and residents   Patient/Family Present   Patient Present No   Patient's Family Present No     303, pt mainly isolative to himself, remains with poor insights into his treatment  Pt is compliant with meds and meals  Continue med management -continue Zyprexa, pt started on Depakote  Continue to monitor  Discharge to be determined

## 2021-02-03 NOTE — PROGRESS NOTES
Occupational Therapy Student Group Note    Patient entered group room during clean-up of afternoon life skills group  Independently assisted in clean-up task  Briefly asked occupational therapy students about the group activity and demonstrated agreeable behaviors to the purpose of the activity

## 2021-02-04 PROCEDURE — 99232 SBSQ HOSP IP/OBS MODERATE 35: CPT | Performed by: STUDENT IN AN ORGANIZED HEALTH CARE EDUCATION/TRAINING PROGRAM

## 2021-02-04 RX ORDER — DIVALPROEX SODIUM 500 MG/1
1500 TABLET, EXTENDED RELEASE ORAL
Status: DISCONTINUED | OUTPATIENT
Start: 2021-02-04 | End: 2021-02-12 | Stop reason: HOSPADM

## 2021-02-04 RX ADMIN — OLANZAPINE 20 MG: 10 TABLET, ORALLY DISINTEGRATING ORAL at 21:00

## 2021-02-04 RX ADMIN — PANTOPRAZOLE SODIUM 20 MG: 20 TABLET, DELAYED RELEASE ORAL at 07:57

## 2021-02-04 RX ADMIN — DIVALPROEX SODIUM 1500 MG: 500 TABLET, EXTENDED RELEASE ORAL at 21:00

## 2021-02-04 NOTE — NURSING NOTE
Patient isolative and withdrawn  He is cooperative and pleasant with this nurse  He denies all psych symptoms  He refused evening snack and states he is tired

## 2021-02-04 NOTE — NURSING NOTE
Remains pressured and tangential although decreased  Questioning when he will be leaving and asking if he will have to leave in his flip flops and T shirt  RN informed patient there has not been a discharge plan set up yet for patient  Remains preoccupied with getting discharged and getting   Writer did observe patient walking up and down the sousa talking to self

## 2021-02-04 NOTE — PROGRESS NOTES
Progress Note - Behavioral Health   Marielena Guerrero 54 y o  male MRN: [id number]  Unit/Bed#: -01 Encounter: [id number]      Subjective:  Patient was cooperative with interview this morning  Patient continues to verbalize delusional material and speak in a pressured manner  He continues to be preoccupied with becoming  in the next 5 years  He was perseverative on this topic and was anxious about completing this goal   He plans to leave the unit, get his new car and drive straight to [place] and try to find a place to live and a job probably at a [employer]  When asked to explain the need for marriage, patient discussed at length his many good qualities as a partner, stated things that were grandiose  He became tangential at times and made an inappropriate comment  He reports improvement in his bilateral hand tremor, stating that he has not noticed experiencing it since  He is compliant with meals and with medications, denies experiencing adverse effects  States that he has been attending group  Patient is isolative and withdrawn to his room, stating that he does not like to interact with others on the unit  Reports continued adequate sleep and appetite    He continues to be future and goal oriented and less preoccupied with the past     Current Medications:  Current Facility-Administered Medications   Medication Dose Route Frequency Provider Last Rate    acetaminophen  650 mg Oral Q6H PRN Barry Mckay MD      acetaminophen  650 mg Oral Q4H PRN Barry Mckay MD      acetaminophen  975 mg Oral Q6H PRN Barry Mckay MD      aluminum-magnesium hydroxide-simethicone  30 mL Oral Q4H PRN Barry Mckay MD      benztropine  1 mg Intramuscular Q1H PRN Barry Mckay MD      benztropine  1 mg Oral Q1H PRN Barry Mckay MD      divalproex sodium  1,000 mg Oral HS Aminah Barragan MD      hydrOXYzine HCL  25 mg Oral Q4H PRN Barry Mckay MD      LORazepam  2 mg Intramuscular Q4H PRN Chiki Washington MD      LORazepam  1 mg Oral Q4H PRN Chiki Washington MD      magnesium hydroxide  30 mL Oral Daily PRN Chiki Washington MD      nicotine  1 patch Transdermal Daily PRN Chiki Washington MD      OLANZapine  20 mg Oral HS MD Tyrell Purcell OLANZapine  10 mg Intramuscular HS Destinee Green MD      OLANZapine  10 mg Intramuscular Q3H PRN Chiki Washington MD      OLANZapine  10 mg Oral Q3H PRN Chiki Washington MD      OLANZapine  5 mg Oral Q3H PRN Chiki Washington MD      OLANZapine  7 5 mg Oral Q3H PRN Chiki Washington MD      pantoprazole  20 mg Oral Daily Chiki Washington MD      traZODone  50 mg Oral HS PRN Chiki Washington MD           Vital signs in last 24 hours:  Temp:  [97 6 °F (36 4 °C)-97 8 °F (36 6 °C)] 97 8 °F (36 6 °C)  HR:  [77-79] 79  Resp:  [16] 16  BP: (129-132)/(81-86) 129/81    Laboratory results:  I have personally reviewed all pertinent laboratory/tests results  Mental Status Evaluation:  Appearance:  appears stated age, casually dressed and Has a mustache   Behavior:  cooperative, good eye contact and Some psychomotor agitation   Speech:  Pressured, less   Mood:  "Good"   Affect:  Increased in intensity   Thought Process:  Perseverative, tangential   Goal and Future oriented   Thought Content:  delusions and Grandiose   Perceptual Disturbances: Denies auditory or visual hallucinations and Does not appear to be responding to internal stimuli   Risk Potential: Denies suicidal or homicidal ideation   Sensorium:  person, place, time/date and situation   Cognition:  grossly intact   Consciousness:  alert and awake   Attention: attention span and concentration were age appropriate   Insight:  Improving   Judgment: Improving   Intellect appears to be of average intelligence   Gait/Station: normal gait/station   Motor Activity: no abnormal movements    No bilateral hand tremor, no tongue tremor appreciated       Progress Toward Goals:  Progressing      Assessment/Plan   Principal Problem:    Schizoaffective disorder, bipolar type ([facility] [address] )        Recommended Treatment:   -Continue Olanzapine 20 mg q h s  for mood stabilization  -Increase Depakote to 1500 mg q h s  for mood stabilization  -Valproic acid level scheduled for Monday morning 2/8/21  -Continue with pharmacotherapy, group, milieu, and occupational therapy    Risks, benefits and possible side effects of Medications:   Risks, benefits, and possible side effects of medications explained to patient and patient verbalizes understanding  This note has been constructed using a voice recognition system  There may be translation, syntax,  or grammatical errors  If you have any questions, please contact the dictating provider  CHRISS Corbett    Psychiatry, PGY-2

## 2021-02-04 NOTE — TREATMENT TEAM
02/04/21 0863   Team Meeting   Meeting Type Daily Rounds   Team Members Present   Team Members Present Physician;Nurse;; Other (Discipline and Name)   Physician Team Member Dr Michelle Betancourt and CLARK FORTE [facility] Management Team Member Sarika Cash   Other (Discipline and Name) Nataliia Giraldo   Patient/Family Present   Patient Present No   Patient's Family Present No     303, pt remains grandiose, pressured, isolative and withdrawn  Pt is compliant with meds and meals  Continue med management  Continue to monitor  Discharge to be determined

## 2021-02-04 NOTE — NURSING NOTE
Pt preoccupied with getting  before " 61 yrs old "  Pt also wants to find a job at [employer] or [employer]  Pt denies SI/HI and AVH  Pt is visible at times in the milieu but does not socialize much with staff or patients  Pt stated his sleep and appetite is good  Pt is compliant with medications   Will continue to monitor

## 2021-02-04 NOTE — CASE MANAGEMENT
Pt was laying on bed when approached by the writer  Pt states he has been feeling better and looking forward to discharge soon  Pt continue to verbalized his romantic delusions of finding a woman and getting  "I want to go to [place], [place] but it is still February  I guess I have to wait at least 2 more months before I head to [facility]"  CM discussed disposition as part of discharge plan  Pt states "I can go to my friend [employer] but he's currently in a Rehab  Not sure if Harsh[facility] mother will let me stay in there"  In that case, he will just go to [employer] and get his car and  clothing and will head [facility] until he goes to [place], [place]  Pt denied having SI, HI and AVH  Pt is grandiose, with  pressured speech  Continue to monitor

## 2021-02-04 NOTE — PROGRESS NOTES
Occupational Therapy Student Group Note    Patient attended morning community meeting and remained standing in the back of the room  Able to verbally share goals when prompted and elaborated on goals once D/C  Articulate and organized speech with a calm demeanor  Did not participate in group discussion about obstacles and opportunities

## 2021-02-05 PROBLEM — F31.9 BIPOLAR 1 DISORDER (HCC): Status: ACTIVE | Noted: 2021-01-24

## 2021-02-05 PROCEDURE — 99232 SBSQ HOSP IP/OBS MODERATE 35: CPT | Performed by: STUDENT IN AN ORGANIZED HEALTH CARE EDUCATION/TRAINING PROGRAM

## 2021-02-05 RX ORDER — OLANZAPINE 10 MG/1
10 INJECTION, POWDER, LYOPHILIZED, FOR SOLUTION INTRAMUSCULAR
Status: DISCONTINUED | OUTPATIENT
Start: 2021-02-05 | End: 2021-02-08

## 2021-02-05 RX ADMIN — OLANZAPINE 25 MG: 10 TABLET, ORALLY DISINTEGRATING ORAL at 21:12

## 2021-02-05 RX ADMIN — PANTOPRAZOLE SODIUM 20 MG: 20 TABLET, DELAYED RELEASE ORAL at 06:28

## 2021-02-05 RX ADMIN — DIVALPROEX SODIUM 1500 MG: 500 TABLET, EXTENDED RELEASE ORAL at 21:12

## 2021-02-05 NOTE — CASE MANAGEMENT
Met with patient, discussed provided patient condolences on loss of his son  Pt states "Its terrible, but I did not seem my son Vernell Cody'  I hope I can go to the  service"  Pt frequently asking if he can be let go "Since most of the people you guys are getting out are only half good compare to me"  CM discussed importance of continuing treatment and using support here  Treatment team will inform accordingly when patient is ready for discharge  Pt has poor insights into his treatment  Pt signed JACQUIE for his Ex-fiance Vladdanielle Healy phone: [phone]    Pt denied having SI, HI and AVH  Continue to monitor

## 2021-02-05 NOTE — PROGRESS NOTES
21 0930   Activity/Group Checklist   Group   (goals group)   Attendance Attended   Attendance Duration (min) 16-30   Interactions Interacted appropriately   Affect/Mood Appropriate   Goals Achieved Able to listen to others; Able to engage in interactions   Patient is preoccupied with discharge especially since his son    He seems emotionally detatched from his death

## 2021-02-05 NOTE — NURSING NOTE
Pt got call today from ex GF to inform him that his 29 yr old son  of drug OD   Service will be on 2021 in [place], [place]  Pt was tearful during phone call   Pt stated he has not seen his son in 6 years and this is going to be the worse part about dealing with his death  Pt denies SI/HI and AVH still  Pt did say that he is in here because " I have information in a cylinder that can get a [place] police man fired so the [employer] threw me in here for 15 days and got rid of the evidence   You know  cover up for other   This is why I am stuck in here  I am not crazy just a victim  "  Will continue to monitor

## 2021-02-05 NOTE — NURSING NOTE
Pt lacks insight into his illness  Pt denies all s/s  Pt sadder today because he stated he has been thinking about his son who dies last week  Pt stated that the " saddest thing is that I did not even know my son did drugs  "  Pt is grandiose and goes off on a tangent when being assessed  Gill Franklin again started to talk about being set up by the [facility] police department because of the evidence that he has against Samm Floyd  Pt is compliant with medications but does not attend groups  Pt denies SI/HI  Pt stated that the medications are making me " drowsy and I do not even take Vitamins  I am all natural   I go to the gym and work out with the young ones and I hold my own   It because I eat healthy without Vitamins "  Will continue to monitor

## 2021-02-05 NOTE — TREATMENT TEAM
21 0854   Team Meeting   Meeting Type Daily Rounds   Team Members Present   Team Members Present Physician;Nurse;; Other (Discipline and Name)   Physician Team Member Dr Anders Smith and CLARK FORTE [facility] Management Team Member Joselin Colmenares   Other (Discipline and Name)    Patient/Family Present   Patient Present No   Patient's Family Present No     303, mostly to himself  Pt compliant with meds and meals  Remains preoccupied with getting  to a beautiful woman  In the evening, pt received a phone call from Ex- about death of their son (25 y/o) due to OD  Pt states he has not seen his son for over 11 years  Per report  service will b on , -CM to assist patient with appropriate steps after getting JACQUIE  Continue med management  Discharge to be determined  Continue to monitor

## 2021-02-05 NOTE — PROGRESS NOTES
Progress Note - Behavioral Health   Mckay Solomon 54 y o  male MRN: [id number]  Unit/Bed#: [facility] 344-01 Encounter: [id number]      Subjective:  Patient was seen for continuing care and reviewed with the treatment team    Patient had reported last night that he had received a phone call from his ex girlfriend informing him that his 49-year-old son had  of a drug overdose  Patient was calm and cooperative with interview today  Patient recounted how he had heard about the death of his son  Stated that he cannot handle it  Began saying that he did not know that his sons do drugs  Patient then seem to go on a tangent, speaking about owning a supermarket, apartment complex in the past and that he used the money he made to pay for child support  Patient then began speaking how he paid for braces for his children, speaking about the kinds of braces he bought and compared recent prices to the prices he had paid  He then spoke about how he bought his son an SUV when he was in high school  Patient reported he had not seen his son since around the time he graduated high school - about 8 or 9 years  When asked to discuss his emotions surrounding the loss of his son, patient was unable to do so  He started to state that he felt regret, then rationalized how his son would have , finally stating that he was confused  When asked about his emotions again patient continue tangentially  He stated that he does want to go to the  next week and discussed the things he has to do in order to prepare for it, such as getting his dress clothes from his storage facility  Patient did mention his previous future goals of getting  and moving to [place]       Current Medications:  Current Facility-Administered Medications   Medication Dose Route Frequency Provider Last Rate    acetaminophen  650 mg Oral Q6H PRN Mark Barraza MD      acetaminophen  650 mg Oral Q4H PRN MD Tenzin Gould acetaminophen  975 mg Oral Q6H PRN Lucas Nieves MD      aluminum-magnesium hydroxide-simethicone  30 mL Oral Q4H PRN Lucas Nieves MD      benztropine  1 mg Intramuscular Q1H PRN Lucas Nieves MD      benztropine  1 mg Oral Q1H PRN Lucas Nieves MD      divalproex sodium  1,500 mg Oral HS Harsh Villarreal, DO      hydrOXYzine HCL  25 mg Oral Q4H PRN Lucas Nieves MD      LORazepam  2 mg Intramuscular Q4H PRN Lucas Nieves MD      LORazepam  1 mg Oral Q4H PRN Lucas Nieves MD      magnesium hydroxide  30 mL Oral Daily PRN Lucas Nieves MD      nicotine  1 patch Transdermal Daily PRN Lucas Nieves MD      OLANZapine  25 mg Oral HS Harsh Gloss, DO      Or    OLANZapine  10 mg Intramuscular HS Harsh Gloss, DO      OLANZapine  10 mg Intramuscular Q3H PRN Lucas Nieves MD      OLANZapine  10 mg Oral Q3H PRN Lucas Nieves, MD      OLANZapine  5 mg Oral Q3H PRN Lucas Nieves, MD      OLANZapine  7 5 mg Oral Q3H PRN Lucas Nieves MD      pantoprazole  20 mg Oral Daily Lucas Nieves MD      traZODone  50 mg Oral HS PRN Lucas Nieves MD           Vital signs in last 24 hours:  Temp:  [97 6 °F (36 4 °C)-98 5 °F (36 9 °C)] 98 5 °F (36 9 °C)  HR:  [79-86] 86  Resp:  [16] 16  BP: (126-127)/(64-84) 126/84    Laboratory results:  I have personally reviewed all pertinent laboratory/tests results          Mental Status Evaluation:  Appearance:  appears stated age, casually dressed, sitting upright in chair and Has a mustache, wearing a mask   Behavior:  cooperative, good eye contact and Some psychomotor agitation though less than previous days   Speech:  Less pressured   Mood:  "Okay"   Affect:  Constricted   Thought Process:  goal directed and tangential   Thought Content:  Delusions of grandeur   Perceptual Disturbances: Denies auditory or visual hallucinations and Does not appear to be responding to internal stimuli   Risk Potential: Denies suicidal or homicidal ideation   Sensorium:  person, place, time/date and situation   Cognition:  grossly intact   Consciousness:  alert and awake   Attention: attention span and concentration were age appropriate   Insight:  limited   Judgment: limited   Intellect appears to be of average intelligence   Gait/Station: normal gait/station   Motor Activity: no abnormal movements       Progress Toward Goals:  Progressing    Assessment/Plan   Principal Problem:    Bipolar 1 disorder ([facility] [address] )        Recommended Treatment:   -Increase olanzapine to 25 mg q h s   -Continue Depakote 1500 mg q h s   -Valproic acid level for Monday morning 02/08/21  -Continue with pharmacotherapy, group, milieu, and occupational therapy    Risks, benefits and possible side effects of Medications:   Risks, benefits, and possible side effects of medications explained to patient and patient verbalizes understanding  This note has been constructed using a voice recognition system  There may be translation, syntax,  or grammatical errors  If you have any questions, please contact the dictating provider  CHRISS Biggs    Psychiatry, PGY-2

## 2021-02-05 NOTE — NURSING NOTE
Patient about the unit  Doing laps in the sousa  Preoccupied with discharge and questioning when he will be leaving  Hopeful for discharge so that he can go to his sons   Vecarlos a Jones to lack insight  " I don't need to be here"  States that he can not believe that some of his peers are being discharged and he's not  Less pressured/rambling during interaction  Cooperative  Denies symptoms

## 2021-02-05 NOTE — CASE MANAGEMENT
CM called and provided brief update to pt's ex-yaritza Bertrand Meo at [phone]  Alma Michaels stated "I and Belen Jorgensen were together for more than 10 years  I heard about his son  due to OD and I wanted to keep him informed"  Alma Michaels states since she is partly involved with ex's family as they live in the area  After learning about son's death I called Belen Jorgensen at the hospital to inform  "I told Belen Jorgensen that the  is scheduled for 21 but I am not sure and will have to confirm this with the family"  She further adds that "I recently changed my phone and lost my contacts  So, I will get back to you once I visit son's mother / Belen Jorgensen ex to inquire details of the  services"  Alma Michaels briefly noted "Belen Jorgensen has been having issues hearing voices and talking to [facility] brothers  But he never got any help"  CM to follow up

## 2021-02-06 PROCEDURE — 99232 SBSQ HOSP IP/OBS MODERATE 35: CPT | Performed by: PSYCHIATRY & NEUROLOGY

## 2021-02-06 RX ADMIN — ACETAMINOPHEN 975 MG: 325 TABLET ORAL at 16:48

## 2021-02-06 RX ADMIN — DIVALPROEX SODIUM 1500 MG: 500 TABLET, EXTENDED RELEASE ORAL at 21:46

## 2021-02-06 RX ADMIN — OLANZAPINE 25 MG: 10 TABLET, ORALLY DISINTEGRATING ORAL at 21:46

## 2021-02-06 RX ADMIN — PANTOPRAZOLE SODIUM 20 MG: 20 TABLET, DELAYED RELEASE ORAL at 06:02

## 2021-02-06 NOTE — PROGRESS NOTES
Pt is dismissive, delusional, seems somatic tonight complaining suddenly of rotator cuff pain  He states he did not do anything to hurt it and denies previous rotator cuff issues  He remains delusional, disinterested in treatment, does not believe he is ill  His speech is rapid, pressured

## 2021-02-06 NOTE — PLAN OF CARE
Problem: DEPRESSION  Goal: Will be euthymic at discharge  Description: INTERVENTIONS:  - Administer medication as ordered  - Provide emotional support via 1:1 interaction with staff  - Encourage involvement in milieu/groups/activities  - Monitor for social isolation  Outcome: Progressing     Problem: SUBSTANCE USE/ABUSE  Goal: Will have no detox symptoms and will verbalize plan for changing substance-related behavior  Description: INTERVENTIONS:  - Monitor physical status and assess for symptoms of withdrawal  - Administer medication as ordered  - Provide emotional support with 1 on 1 interaction with staff  - Encourage recovery focused program/ addiction education  - Assess for verbalization of changing behaviors related to substance abuse  - Initiate consults and referrals as appropriate (Case Management, Spiritual Care, etc )  Outcome: Progressing  Goal: By discharge, will develop insight into their chemical dependency and sustain motivation to continue in recovery  Description: INTERVENTIONS:  - Attends all daily group sessions and scheduled AA groups  - Actively practices coping skills through participation in the therapeutic community and adherence to program rules  - Reviews and completes assignments from individual treatment plan  - Assist patient development of understanding of their personal cycle of addiction and relapse triggers  Outcome: Progressing  Goal: By discharge, patient will have ongoing treatment plan addressing chemical dependency  Description: INTERVENTIONS:  - Assist patient with resources and/or appointments for ongoing recovery based living  Outcome: Progressing     Problem: INVOLUNTARY ADMIT  Goal: Will cooperate with staff recommendations and doctor's orders and will demonstrate appropriate behavior  Description: INTERVENTIONS:  - Treat underlying conditions and offer medication as ordered  - Educate regarding involuntary admission procedures and rules  - Utilize positive consistent limit setting strategies to support patient and staff safety  Outcome: Progressing     Problem: PSYCHOSIS  Goal: Will report no hallucinations or delusions  Description: Interventions:  - Administer medication as  ordered  - Every waking shifts and PRN assess for the presence of hallucinations and or delusions  - Assist with reality testing to support increasing orientation  - Assess if patient's hallucinations or delusions are encouraging self-harm or harm to others and intervene as appropriate  Outcome: Not Progressing

## 2021-02-06 NOTE — PROGRESS NOTES
C/O"my son  dealing  with thsi loss "    Report from staff regarding this patient received and record reviewed  prior to seeing this patient   Behavior over the last 24 hours:  Patient seen for bipolar disorder taking medication staff notified that his son overdosed and  couple of days ago which patient is aware of it he talked about it, feels there is not much she can, wants to get discharged, and go home  Sleep:ok  Appetite:ok  Medication side effects:  Denies  ROS:  Grieving son's loss  Mental Status Evaluation:  Appearance:  Dressed appropraitely   Behavior:  cooperative   Speech:  litlle pressured   Mood:  anxious   Affect:  appropriate     Thought Process:  Goal directed   Thought Content:  normal   Perceptual Disturbances: Denied AV hallucination   Risk Potential: NO KAILASH    Sensorium:  normal   Cognition:  intact   Consciousness:  Alert, OX3   Attention: Fair   Insight:  Limited   Judgment: Limited   Gait/Station: With in normal range   Motor Activity: With in normal range     Progress Toward Goals: working on current treatment goals, no changes  Made in treatment plan   Recommended Treatment: Continue with group therapy, milieu therapy and occupational therapy  Risks, benefits and possible side effects of Medications:   Risks, benefits, and possible side effects of medications explained to patient and patient verbalizes understanding        Medications:   current meds:   Current Facility-Administered Medications   Medication Dose Route Frequency    acetaminophen (TYLENOL) tablet 650 mg  650 mg Oral Q6H PRN    acetaminophen (TYLENOL) tablet 650 mg  650 mg Oral Q4H PRN    acetaminophen (TYLENOL) tablet 975 mg  975 mg Oral Q6H PRN    aluminum-magnesium hydroxide-simethicone (MYLANTA) oral suspension 30 mL  30 mL Oral Q4H PRN    benztropine (COGENTIN) injection 1 mg  1 mg Intramuscular Q1H PRN    benztropine (COGENTIN) tablet 1 mg  1 mg Oral Q1H PRN    divalproex sodium (DEPAKOTE ER) 24 hr tablet 1,500 mg  1,500 mg Oral HS    hydrOXYzine HCL (ATARAX) tablet 25 mg  25 mg Oral Q4H PRN    LORazepam (ATIVAN) injection 2 mg  2 mg Intramuscular Q4H PRN    LORazepam (ATIVAN) tablet 1 mg  1 mg Oral Q4H PRN    magnesium hydroxide (MILK OF MAGNESIA) oral suspension 30 mL  30 mL Oral Daily PRN    nicotine (NICODERM CQ) 14 mg/24hr TD 24 hr patch 1 patch  1 patch Transdermal Daily PRN    OLANZapine (ZyPREXA ZYDIS) dispersible tablet 25 mg  25 mg Oral HS    Or    OLANZapine (ZyPREXA) IM injection 10 mg  10 mg Intramuscular HS    OLANZapine (ZyPREXA) IM injection 10 mg  10 mg Intramuscular Q3H PRN    OLANZapine (ZyPREXA) tablet 10 mg  10 mg Oral Q3H PRN    OLANZapine (ZyPREXA) tablet 5 mg  5 mg Oral Q3H PRN    OLANZapine (ZyPREXA) tablet 7 5 mg  7 5 mg Oral Q3H PRN    pantoprazole (PROTONIX) EC tablet 20 mg  20 mg Oral Daily    traZODone (DESYREL) tablet 50 mg  50 mg Oral HS PRN     Labs: NA    Assessment, Diagnosis  and Plan: continue with current meds and goals, F/U tomorrow    Counseling / Coordination of Care  Total floor / unit time spent today20 minutes  minutes  Greater than 50% of total time was spent with the patient and / or family counseling and / or coordination of care  A description of the counseling / coordination of care:      Santosh Flynn MD

## 2021-02-06 NOTE — PROGRESS NOTES
Pt is scant, guarded, isolative from the milieu  He is grandiose, paranoid, overheard talking on the phone in hushed tones telling the person he is "deathly afraid" and talking about spending 10 years in longterm previously  Pt next response to this person was "Yes I'm taking medications "  He displays little interest or insight into his treatment, stated he is doing "fine" and still dismissive in conversation  He did not make any mention of his son or recent events during assessment today, denies all S/S

## 2021-02-07 PROCEDURE — 99232 SBSQ HOSP IP/OBS MODERATE 35: CPT | Performed by: PSYCHIATRY & NEUROLOGY

## 2021-02-07 RX ORDER — IBUPROFEN 400 MG/1
400 TABLET ORAL EVERY 8 HOURS SCHEDULED
Status: DISCONTINUED | OUTPATIENT
Start: 2021-02-07 | End: 2021-02-10

## 2021-02-07 RX ADMIN — IBUPROFEN 400 MG: 400 TABLET ORAL at 21:09

## 2021-02-07 RX ADMIN — IBUPROFEN 400 MG: 400 TABLET ORAL at 14:41

## 2021-02-07 RX ADMIN — DIVALPROEX SODIUM 1500 MG: 500 TABLET, EXTENDED RELEASE ORAL at 21:08

## 2021-02-07 RX ADMIN — ACETAMINOPHEN 975 MG: 325 TABLET ORAL at 02:31

## 2021-02-07 RX ADMIN — PANTOPRAZOLE SODIUM 20 MG: 20 TABLET, DELAYED RELEASE ORAL at 06:00

## 2021-02-07 RX ADMIN — OLANZAPINE 25 MG: 10 TABLET, ORALLY DISINTEGRATING ORAL at 21:08

## 2021-02-07 RX ADMIN — IBUPROFEN 400 MG: 400 TABLET ORAL at 09:27

## 2021-02-07 NOTE — NURSING NOTE
Patient did not come out of his room all evening  Patient isolative to room and self  Appears depressed but denies  Patient did not come to staff to have any of his needs met  Not seen interacting with anybody

## 2021-02-07 NOTE — PROGRESS NOTES
C/O" my shoulder hurts a lot they give me Tylenol that does not work'    Report from staff regarding this patient received and record reviewed  prior to seeing this patient   Behavior over the last 24 hours: This patient is seen on unit for bipolar disorder ongoing treatment, he is currently taking Zyprexa 25 mg and Depakote 1500 mg at bedtime, his Depakote level is 1st time scheduled on coming Monday, he reported he did not sleep well he talks in turn because of the pain in his left shoulder, and complain that Tylenol did not work for him, requested something else, discussed different options finally agreed on a ibuprofen 400 mg Q 8 hours pain  And advised him to talk to the medical   He indicated that he feels less irritable less angry less agitated  Staff reported patient being paranoid delusional he is meds over objection, and taking medication, his labs are within normal limits  Sleep:  Not good  Appetite:  Okay  Medication side effects:  None  ROS:  Patient is still symptomatic in reference to bipolar disorder delusional paranoid loud pressure speech  Mental Status Evaluation:  Appearance:  Dressed in black T-shirt and black shorts, poorly groomed, slender build, average height white man, talks loud, opinionated   Behavior:  cooperative but easily get it   Speech:  Loud pressured   Mood:  Elevated   Affect:  appropriate     Thought Process:  Disorganized   Thought Content:  Paranoid   Perceptual Disturbances: Denied AV hallucination   Risk Potential: NO KAILASH    Sensorium:  normal   Cognition:  intact   Consciousness:  Alert, OX3   Attention: Fair   Insight:  Limited   Judgment:  limited   Gait/Station: With in normal range   Motor Activity: With in normal range     Progress Toward Goals: working on current treatment goals, no changes  Made in treatment plan   Recommended Treatment: Continue with group therapy, milieu therapy and occupational therapy        Risks, benefits and possible side effects of Medications:   Risks, benefits, and possible side effects of medications explained to patient and patient verbalizes understanding  Medications:   current meds:   Current Facility-Administered Medications   Medication Dose Route Frequency    acetaminophen (TYLENOL) tablet 650 mg  650 mg Oral Q6H PRN    acetaminophen (TYLENOL) tablet 650 mg  650 mg Oral Q4H PRN    acetaminophen (TYLENOL) tablet 975 mg  975 mg Oral Q6H PRN    aluminum-magnesium hydroxide-simethicone (MYLANTA) oral suspension 30 mL  30 mL Oral Q4H PRN    benztropine (COGENTIN) injection 1 mg  1 mg Intramuscular Q1H PRN    benztropine (COGENTIN) tablet 1 mg  1 mg Oral Q1H PRN    divalproex sodium (DEPAKOTE ER) 24 hr tablet 1,500 mg  1,500 mg Oral HS    hydrOXYzine HCL (ATARAX) tablet 25 mg  25 mg Oral Q4H PRN    ibuprofen (MOTRIN) tablet 400 mg  400 mg Oral Q8H [address]    LORazepam (ATIVAN) injection 2 mg  2 mg Intramuscular Q4H PRN    LORazepam (ATIVAN) tablet 1 mg  1 mg Oral Q4H PRN    magnesium hydroxide (MILK OF MAGNESIA) oral suspension 30 mL  30 mL Oral Daily PRN    nicotine (NICODERM CQ) 14 mg/24hr TD 24 hr patch 1 patch  1 patch Transdermal Daily PRN    OLANZapine (ZyPREXA ZYDIS) dispersible tablet 25 mg  25 mg Oral HS    Or    OLANZapine (ZyPREXA) IM injection 10 mg  10 mg Intramuscular HS    OLANZapine (ZyPREXA) IM injection 10 mg  10 mg Intramuscular Q3H PRN    OLANZapine (ZyPREXA) tablet 10 mg  10 mg Oral Q3H PRN    OLANZapine (ZyPREXA) tablet 5 mg  5 mg Oral Q3H PRN    OLANZapine (ZyPREXA) tablet 7 5 mg  7 5 mg Oral Q3H PRN    pantoprazole (PROTONIX) EC tablet 20 mg  20 mg Oral Daily    traZODone (DESYREL) tablet 50 mg  50 mg Oral HS PRN         Labs: NA    Assessment, Diagnosis  and Plan:  Patient reported poor sleep because of the left shoulder pain I started him on ibuprofen 400 mg Q 8 hours for pain and he has a Depakote level for tomorrow and then hopefully we can adjust the Depakote level accordingly, he will see the treatment team to, no side effect of medication  Counseling / Coordination of Care  Total floor / unit time spent today20 minutes  minutes  Greater than 50% of total time was spent with the patient and / or family counseling and / or coordination of care  A description of the counseling / coordination of care:      Jorge Koo MD

## 2021-02-07 NOTE — PROGRESS NOTES
Pt has an irritable edge, speaking rapidly but saying little this morning, wincing in pain as he sat up to take his medications  He is still complaining of left shoulder pain, medications have not been helpful  He quickly requested that writer leave the room and shut the door so he can sleep  He remains withdrawn to his room, paranoid, still believes he is here due to police manipulation to silence him

## 2021-02-08 LAB — VALPROATE SERPL-MCNC: 102.4 UG/ML (ref 50–120)

## 2021-02-08 PROCEDURE — 80164 ASSAY DIPROPYLACETIC ACD TOT: CPT | Performed by: PSYCHIATRY & NEUROLOGY

## 2021-02-08 PROCEDURE — 99232 SBSQ HOSP IP/OBS MODERATE 35: CPT | Performed by: PSYCHIATRY & NEUROLOGY

## 2021-02-08 RX ORDER — OLANZAPINE 10 MG/1
10 INJECTION, POWDER, LYOPHILIZED, FOR SOLUTION INTRAMUSCULAR
Status: DISCONTINUED | OUTPATIENT
Start: 2021-02-08 | End: 2021-02-12 | Stop reason: HOSPADM

## 2021-02-08 RX ORDER — METHOCARBAMOL 500 MG/1
500 TABLET, FILM COATED ORAL EVERY 6 HOURS PRN
Status: DISCONTINUED | OUTPATIENT
Start: 2021-02-08 | End: 2021-02-12 | Stop reason: HOSPADM

## 2021-02-08 RX ORDER — OLANZAPINE 10 MG/1
20 TABLET, ORALLY DISINTEGRATING ORAL
Status: DISCONTINUED | OUTPATIENT
Start: 2021-02-08 | End: 2021-02-12 | Stop reason: HOSPADM

## 2021-02-08 RX ADMIN — DIVALPROEX SODIUM 1500 MG: 500 TABLET, EXTENDED RELEASE ORAL at 21:30

## 2021-02-08 RX ADMIN — PANTOPRAZOLE SODIUM 20 MG: 20 TABLET, DELAYED RELEASE ORAL at 06:04

## 2021-02-08 RX ADMIN — ACETAMINOPHEN 975 MG: 325 TABLET ORAL at 01:04

## 2021-02-08 RX ADMIN — OLANZAPINE 20 MG: 10 TABLET, ORALLY DISINTEGRATING ORAL at 21:29

## 2021-02-08 RX ADMIN — METHOCARBAMOL 500 MG: 500 TABLET, FILM COATED ORAL at 17:06

## 2021-02-08 RX ADMIN — METHOCARBAMOL 500 MG: 500 TABLET, FILM COATED ORAL at 06:04

## 2021-02-08 RX ADMIN — IBUPROFEN 400 MG: 400 TABLET ORAL at 21:29

## 2021-02-08 RX ADMIN — IBUPROFEN 400 MG: 400 TABLET ORAL at 06:04

## 2021-02-08 NOTE — NURSING NOTE
Patient has been mainly seclusive to his room  Less pressured and no delusional content noted during interaction  States that he is tired today and is trying to catch up on sleep due to being awake a lot during the night from left shoulder pain  Currently denying pain  No irritability noted during interaction  Denies symptoms and is hopeful for discharge soon

## 2021-02-08 NOTE — NURSING NOTE
Patient began yelling out in pain in his room; when staff entered he was writing around on his bed, grabbing his left shoulder, stated "it's spasming" and asked staff to "push their hands into his shoulder/armpit area" to help the pain  RN contacted [facility] and new order for Robaxin received; heating pad also given  Will monitor

## 2021-02-08 NOTE — PROGRESS NOTES
Progress Note - Behavioral Health   Bang Verdugo 54 y o  male MRN: [id number]  Unit/Bed#: [facility] 344-01 Encounter: [id number]    Assessment/Plan   Principal Problem:    Bipolar 1 disorder ([address] )    Bang Verdugo is a 54year old male with a primary psychiatric diagnosis of Bipolar I  He continues to have grandiose thoughts and verbalizes delusions regarding the police  He denies any SI/HI/AVH or thoughts of self harm  He is goal oriented when asked about plans of discharge with plans of moving to [facility], obtaining a job, and getting   Discharge plan pending discussion of future actions regarding the police, [place] assessment for violence, and depression scale  -Decrease Olanzapine 25 mg to 20 mg q h s  to minimize potential side effects   - Patient expresses potential for non-compliance with medications upon discharge  Will consider switching to long acting injectable   - Continue Depakote 1500 mg    - Depakote level of 102 4 ug/mL , within therapeutic range   -Broset assessment pending  -Depression scale pending   -Continue with pharmacotherapy, group, milieu, and occupational therapy  -Case management to follow-up with family to determine date of son's   -Continue medical management by primary team   -Discharge disposition:  Patient has limited insight into condition  Discharge pending assessment of patient's plan regarding police, assessment for violence, and depression scale  Subjective: The patient was evaluated this morning for continuity of care and no acute distress noted throughout the evaluation  Over the weekend the patient staff reported the patient was irritable, appeared depressed, isolated to room, paranoid, speaking rapidly, and had complaints of left shoulder pain  He received the following PRN medication: tylenol 975 mg on ,  and , Robaxin 500 mg given  for muscle spasms/ shoulder pain      Today on exam they report "not bad" mood and was cooperative throughout the interview  Yokasta Chahal expressed grandiose thoughts about his athletic and buisness abilities  He continues to verbalize delusions about the [employer], stating that the he has evidence against [employer] that would cause the loss of his pension  He also continues to report a woman he was protecting was killed by two drug dealers  He expresses goals of wanting to move to [place], find a job at [employer] 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, and find a woman to   He mentioned his son passing and expressed that he would like to attend the  to be held this Friday  The patient did not appear distressed or sad when discussing his  son, who he has been estranged from for over a decade  His speech is rapid and tangential, which the patient reports as normal for him stating "I'm [place]"  He reports one prior psychiatric hospitalization at [facility], however, states he was not given a diagnosis and was released after they realized it was a mistake  He is complaint with his medications because he has to take them while he is here  He reports they make him "cloudy and confused" and that he is more alert when he is off them  It appears unlikely patient will be compliant with medication upon discharge  Last night, the patient reports he did not sleep due to left shoulder pain, which is improved today  Usually he reports sleeping 8-9 hours  His appetite is normal and he reports eating all his meals  Energy level is reported as normal  Denies any SI/HI/AVH or thoughts of self harm       Current Medications:  Current Facility-Administered Medications   Medication Dose Route Frequency Provider Last Rate    acetaminophen  650 mg Oral Q6H PRN Tanja Bello MD      acetaminophen  650 mg Oral Q4H PRN Tanja Bello MD      acetaminophen  975 mg Oral Q6H PRN Tanja Bello MD      aluminum-magnesium hydroxide-simethicone  30 mL Oral Q4H PRN Mark Barraza MD      benztropine  1 mg Intramuscular Q1H PRN Mark Barraza MD      benztropine  1 mg Oral Q1H PRN Mark Barraza MD      divalproex sodium  1,500 mg Oral HS Valeda Bright, DO      hydrOXYzine HCL  25 mg Oral Q4H PRN Mark Barraza MD      ibuprofen  400 mg Oral [facility] Paola Deutsch MD      LORazepam  2 mg Intramuscular Q4H PRN Mark Barraza MD      LORazepam  1 mg Oral Q4H PRN Mark Barraza MD      magnesium hydroxide  30 mL Oral Daily PRN Mark Barraza MD      methocarbamol  500 mg Oral Q6H PRN SHAYNE Judd      nicotine  1 patch Transdermal Daily PRN Mark Barraza MD      OLANZapine  25 mg Oral HS Valeda Bright, DO      Or    OLANZapine  10 mg Intramuscular HS Valeda Bright, DO      OLANZapine  10 mg Intramuscular Q3H PRN Mark Barraza MD      OLANZapine  10 mg Oral Q3H PRN Mark Barraza MD      OLANZapine  5 mg Oral Q3H PRN Mark Barraza MD      OLANZapine  7 5 mg Oral Q3H PRN Mark Barraza MD      pantoprazole  20 mg Oral Daily Mark Barraza MD      traZODone  50 mg Oral HS PRN Mark Barraza MD         Behavioral Health Medications: all current active meds have been reviewed and continue current psychiatric medications      Vital signs in last 24 hours:  Temp:  [97 4 °F (36 3 °C)-97 8 °F (36 6 °C)] 97 4 °F (36 3 °C)  HR:  [70-77] 77  Resp:  [16] 16  BP: (130-156)/(79-90) 130/79    Laboratory results:    Most Recent Labs:   Lab Results   Component Value Date    WBC 6 90 01/25/2021    RBC 5 79 01/25/2021    HGB 16 0 01/25/2021    HCT 48 5 01/25/2021     01/25/2021    RDW 14 3 01/25/2021    NEUTROABS 4 00 01/25/2021    SODIUM 138 01/25/2021    K 4 5 01/25/2021     01/25/2021    CO2 27 01/25/2021    BUN 16 01/25/2021    CREATININE 0 85 01/25/2021    GLUC 102 (H) 01/25/2021    CALCIUM 9 2 01/25/2021    AST 31 01/25/2021    ALT 30 01/25/2021    ALKPHOS 61 01/25/2021    TP 7 2 01/25/2021    ALB 4 1 01/25/2021    TBILI 0 60 01/25/2021    CHOLESTEROL 195 01/25/2021    HDL 47 01/25/2021    TRIG 51 01/25/2021    LDLCALC 138 (H) 01/25/2021    NONHDLC 148 01/25/2021    VALPROICTOT 102 4 02/08/2021    QXU3SPTEISLR 2 239 01/22/2021    RPR Non-Reactive 01/25/2021       Psychiatric Review of Systems:  Behavior over the last 24 hours:  unchanged  Sleep: Reports no sleep last night due to L shoulder pain  Appetite: normal  Medication side effects: No  ROS: "cloudy" and confused, L shoulder pain, no other complaints    Mental Status Evaluation:  Appearance:  age appropriate, tattooed and hygenic, wearing ill-fitting hospital attire   Behavior:  cooperative   Speech:  pressured and tangential   Mood:  "not bad"   Affect:  blunted and mood-incongruent   Language rapid   Thought Process:  circumstantial, perserverative and tangential   Thought Content:  delusions and grandiose   Perceptual Disturbances: Denies auditory and visual hallucinations   Risk Potential: Denies suicidal or homicidal ideation   Sensorium:  person, place, time/date and situation   Cognition:  recent and remote memory grossly intact   Consciousness:  alert and awake    Attention: attention span and concentration were age appropriate   Insight:  limited   Judgment: limited, but improving   Intellect  appears to be of average intelligence    Gait/Station: normal gait/station   Motor Activity: no abnormal movements     Memory: Short and long term memory  Grossly intact     Progress Toward Goals: Consider discharge on 2/11/21 pending stable/improving condition  Risks, benefits and possible side effects of Medications:   Risks, benefits, and possible side effects of medications explained to patient and patient verbalizes understanding  This note has been constructed using a voice recognition system  There may be translation, syntax, or grammatical errors  If you have any questions, please contact the dictating provider      Ama Dawson Amy Redman,   [facility]

## 2021-02-08 NOTE — NURSING NOTE
Pt has remained isolative to room this shift, presenting for meals  Pt appears depressed and sad, stating that even though he hasn't been close with his son for a long time, attending his  on Friday would "mean a lot " Pt was pleasant, calm, cooperative, and compliant with treatment and medications  Pt has normal speech at normal rate  Pt denies symptoms other than pain in his left shoulder  Scheduled ibuprofen partially effective

## 2021-02-09 PROBLEM — F20.0 PARANOID SCHIZOPHRENIA (HCC): Status: ACTIVE | Noted: 2021-01-24

## 2021-02-09 PROCEDURE — 99232 SBSQ HOSP IP/OBS MODERATE 35: CPT | Performed by: PSYCHIATRY & NEUROLOGY

## 2021-02-09 RX ADMIN — OLANZAPINE 20 MG: 10 TABLET, ORALLY DISINTEGRATING ORAL at 21:15

## 2021-02-09 RX ADMIN — IBUPROFEN 400 MG: 400 TABLET ORAL at 21:15

## 2021-02-09 RX ADMIN — IBUPROFEN 400 MG: 400 TABLET ORAL at 14:01

## 2021-02-09 RX ADMIN — IBUPROFEN 400 MG: 400 TABLET ORAL at 06:23

## 2021-02-09 RX ADMIN — PANTOPRAZOLE SODIUM 20 MG: 20 TABLET, DELAYED RELEASE ORAL at 06:23

## 2021-02-09 RX ADMIN — METHOCARBAMOL 500 MG: 500 TABLET, FILM COATED ORAL at 11:42

## 2021-02-09 RX ADMIN — ACETAMINOPHEN 975 MG: 325 TABLET ORAL at 00:15

## 2021-02-09 RX ADMIN — DIVALPROEX SODIUM 1500 MG: 500 TABLET, EXTENDED RELEASE ORAL at 21:15

## 2021-02-09 NOTE — PLAN OF CARE
Problem: Ineffective Coping  Goal: Participates in unit activities  Description: Interventions:  - Provide therapeutic environment   - Provide required programming   - Redirect inappropriate behaviors   Outcome: Not Progressing     Problem: Ineffective Coping  Goal: Participates in unit activities  Description: Interventions:  - Provide therapeutic environment   - Provide required programming   - Redirect inappropriate behaviors   Outcome: Not Progressing

## 2021-02-09 NOTE — NURSING NOTE
Patient is tired and he is also in pain  He received Robaxen 500mgs po prn x 1 this evening and scheduled Motrin  He is cooperative with scheduled medications but he has slept on and off this evening and is staying in his room

## 2021-02-09 NOTE — PROGRESS NOTES
Progress Note - Behavioral Health   Jaz Hernadez 54 y o  male MRN: [id number]  Unit/Bed#: [facility] 344-01 Encounter: [id number]    Assessment/Plan   Principal Problem:    Paranoid schizophrenia (HCC)    · Continue Depakote 1500 mg   ? Depakote level of 102 4 ug/mL , within therapeutic range   · Continue Olanzapine 20 mg q h s   ? No reported side effects  ? Discussed with patient importance of taking medications upon discharge  Patient expresses he will see how he feels  Refuses suggestion of IM medication "is afraid of needles"  · Broset assessment completed:  ? Confused: 0  ? Irritable: 0  ? Boisterous: 0  ? Verbal threats: 0      ? Physical threats: 0  ? Patient attack objects: 0  ? Total Score of 0, the risk of violence within the next 24 hours is small  · Depression scale pending   · Continue with pharmacotherapy, group, milieu, and occupational therapy  · Case management to follow-up with family to determine date of son's   · Continue medical management by primary team   · Discharge disposition:  Patient evidences slow but steady improvement, discharge planning for either  or  pending his son's  date  Subjective: The patient was evaluated this morning for continuity of care and no acute distress noted throughout the evaluation  He is compliant with medications  Over the past 24 hours the patient has been mostly in his room and noted to be less pressured with no delusional content noted during staff interactions  He received PRN Robaxen 500 mg in the evening and tylenol 975 mg at 0015 for shoulder pain  Today on exam the patient was pleasant and cooperative and reports "pretty good" mood  He endorses left shoulder pain for which he received p r n  Robaxin with mild relief  He notes that he is hoping to attend his son's , with current information indicating it is Saturday  Last night, the patient reports he did not sleep well, ~4 hours, due to left shoulder pain    His appetite is normal and he reports eating all his meals  Energy level is reported as "dragging a little"   He denies any SI/HI/AVH or thoughts of self harm   Patient continues to express ideation of collusion amongst local police officers but reports he plans to "put it all in the past" on discharge and will focus on obtaining a new job and relocating to [place], [place]      Current Medications:  Current Facility-Administered Medications   Medication Dose Route Frequency Provider Last Rate    acetaminophen  650 mg Oral Q6H PRN Russell Garland MD      acetaminophen  650 mg Oral Q4H PRN Russell aGrland, MD      acetaminophen  975 mg Oral Q6H PRN Russell Garland, MD      aluminum-magnesium hydroxide-simethicone  30 mL Oral Q4H PRN Russell Garland MD      benztropine  1 mg Intramuscular Q1H PRN Russell Garland MD      benztropine  1 mg Oral Q1H PRN Russell Garland MD      divalproex sodium  1,500 mg Oral HS Cliffton Quale, DO      hydrOXYzine HCL  25 mg Oral Q4H PRN Russell Garland MD      ibuprofen  400 mg Oral [facility] Aaron Moncada MD      LORazepam  2 mg Intramuscular Q4H PRN Russell Garland MD      LORazepam  1 mg Oral Q4H PRN Russell Garland MD      magnesium hydroxide  30 mL Oral Daily PRN Russell Garland MD      methocarbamol  500 mg Oral Q6H PRN [place] [facility]      nicotine  1 patch Transdermal Daily PRN Russell Garland MD      OLANZapine  20 mg Oral HS Jean Carlos Bucca, DO      Or    OLANZapine  10 mg Intramuscular HS Jean Carlos Bucca, DO      OLANZapine  10 mg Intramuscular Q3H PRN Russell Garland MD      OLANZapine  10 mg Oral Q3H PRN Russell Garland MD      OLANZapine  5 mg Oral Q3H PRN Russell Garland MD      OLANZapine  7 5 mg Oral Q3H PRN Russell Garland MD      pantoprazole  20 mg Oral Daily Russell Garland MD      traZODone  50 mg Oral HS PRN Russell Garland MD         Behavioral Health Medications:   all current active meds have been reviewed, continue current psychiatric medications and current meds:   Current Facility-Administered Medications   Medication Dose Route Frequency    acetaminophen (TYLENOL) tablet 650 mg  650 mg Oral Q6H PRN    acetaminophen (TYLENOL) tablet 650 mg  650 mg Oral Q4H PRN    acetaminophen (TYLENOL) tablet 975 mg  975 mg Oral Q6H PRN    aluminum-magnesium hydroxide-simethicone (MYLANTA) oral suspension 30 mL  30 mL Oral Q4H PRN    benztropine (COGENTIN) injection 1 mg  1 mg Intramuscular Q1H PRN    benztropine (COGENTIN) tablet 1 mg  1 mg Oral Q1H PRN    divalproex sodium (DEPAKOTE ER) 24 hr tablet 1,500 mg  1,500 mg Oral HS    hydrOXYzine HCL (ATARAX) tablet 25 mg  25 mg Oral Q4H PRN    ibuprofen (MOTRIN) tablet 400 mg  400 mg Oral Q8H [facility]    LORazepam (ATIVAN) injection 2 mg  2 mg Intramuscular Q4H PRN    LORazepam (ATIVAN) tablet 1 mg  1 mg Oral Q4H PRN    magnesium hydroxide (MILK OF MAGNESIA) oral suspension 30 mL  30 mL Oral Daily PRN    methocarbamol (ROBAXIN) tablet 500 mg  500 mg Oral Q6H PRN    nicotine (NICODERM CQ) 14 mg/24hr TD 24 hr patch 1 patch  1 patch Transdermal Daily PRN    OLANZapine (ZyPREXA ZYDIS) dispersible tablet 20 mg  20 mg Oral HS    Or    OLANZapine (ZyPREXA) IM injection 10 mg  10 mg Intramuscular HS    OLANZapine (ZyPREXA) IM injection 10 mg  10 mg Intramuscular Q3H PRN    OLANZapine (ZyPREXA) tablet 10 mg  10 mg Oral Q3H PRN    OLANZapine (ZyPREXA) tablet 5 mg  5 mg Oral Q3H PRN    OLANZapine (ZyPREXA) tablet 7 5 mg  7 5 mg Oral Q3H PRN    pantoprazole (PROTONIX) EC tablet 20 mg  20 mg Oral Daily    traZODone (DESYREL) tablet 50 mg  50 mg Oral HS PRN     Vital signs in last 24 hours:  Temp:  [97 9 °F (36 6 °C)-98 1 °F (36 7 °C)] 97 9 °F (36 6 °C)  HR:  [75-87] 87  Resp:  [16] 16  BP: (131-160)/(78-96) 146/88    Laboratory results:    I have personally reviewed all pertinent laboratory/tests results    Most Recent Labs:   Lab Results   Component Value Date    WBC 6 90 01/25/2021    RBC 5 79 01/25/2021    HGB 16 0 01/25/2021    HCT 48 5 01/25/2021     01/25/2021    RDW 14 3 01/25/2021    NEUTROABS 4 00 01/25/2021    SODIUM 138 01/25/2021    K 4 5 01/25/2021     01/25/2021    CO2 27 01/25/2021    BUN 16 01/25/2021    CREATININE 0 85 01/25/2021    GLUC 102 (H) 01/25/2021    CALCIUM 9 2 01/25/2021    AST 31 01/25/2021    ALT 30 01/25/2021    ALKPHOS 61 01/25/2021    TP 7 2 01/25/2021    ALB 4 1 01/25/2021    TBILI 0 60 01/25/2021    CHOLESTEROL 195 01/25/2021    HDL 47 01/25/2021    TRIG 51 01/25/2021    LDLCALC 138 (H) 01/25/2021    NONHDLC 148 01/25/2021    VALPROICTOT 102 4 02/08/2021    YHF0PGYALSFU 2 239 01/22/2021    RPR Non-Reactive 01/25/2021     Admission Labs:   Admission on 02/01/2021   Component Date Value    Valproic Acid, Total 02/08/2021 102 4        Psychiatric Review of Systems:  Behavior over the last 24 hours:  improved  Sleep:  Reports 4 hours of sleep due to shoulder pain  Appetite: normal  Medication side effects: Yes "groggy" due to pain medication  ROS: Left shoulder pain, no other complaints    Mental Status Evaluation:  Appearance:  age appropriate, tattooed and hygenic, kept facial hair, sitting upright in bed, casual attire   Behavior:  Pleasant and cooperative   Speech:  pressured and tangential   Mood:  "Pretty good"   Affect:  blunted   Language naming objects and repeating phrases   Thought Process:  improving perserverative and tangential thought process   Thought Content:  chronic delusions, grandiose   Perceptual Disturbances: Denies auditory or visual hallucinations   Risk Potential: Denies suicidal or homicidal ideation   Sensorium:  person, place, time/date and situation   Cognition:  recent and remote memory grossly intact   Consciousness:  alert and awake    Attention: attention span appeared shorter than expected for age   Insight:  limited but improving   Judgment: limited   Intellect Average   Gait/Station: normal gait/station and normal balance   Motor Activity: no abnormal movements     Memory: Short and long term memory  intact     Progress Toward Goals:  Slight improvement from yesterday, consider discharge on 02/11/21 or 02/12/21 pending stable/improving condition  Recommended Treatment:   See above for assessment and plan       Continue following current medications:   Current Facility-Administered Medications   Medication Dose Route Frequency Provider Last Rate    acetaminophen  650 mg Oral Q6H PRN Luisa Sood MD      acetaminophen  650 mg Oral Q4H PRN Luisa Sood MD      acetaminophen  975 mg Oral Q6H PRN Luisa Sood MD      aluminum-magnesium hydroxide-simethicone  30 mL Oral Q4H PRN Luisa Sood MD      benztropine  1 mg Intramuscular Q1H PRN Luisa Sood MD      benztropine  1 mg Oral Q1H PRN Luisa Sood MD      divalproex sodium  1,500 mg Oral HS Shelly Breaux,       hydrOXYzine HCL  25 mg Oral Q4H PRN Luisa Sood MD      ibuprofen  400 mg Oral [facility] Brianna Cox MD      LORazepam  2 mg Intramuscular Q4H PRN Luisa Sood MD      LORazepam  1 mg Oral Q4H PRN Luisa Sood MD      magnesium hydroxide  30 mL Oral Daily PRN Luisa Sood MD      methocarbamol  500 mg Oral Q6H PRN SHAYNE Michel      nicotine  1 patch Transdermal Daily PRN Luisa Sood MD      OLANZapine  20 mg Oral HS Jean Carlos Bucca, DO      Or    OLANZapine  10 mg Intramuscular HS Jean Carlos Bucca, DO      OLANZapine  10 mg Intramuscular Q3H PRN Luisa Sood MD      OLANZapine  10 mg Oral Q3H PRN Luisa Sood MD      OLANZapine  5 mg Oral Q3H PRN Luisa Sood MD      OLANZapine  7 5 mg Oral Q3H PRN Luisa Sood MD      pantoprazole  20 mg Oral Daily Luisa Sood MD      traZODone  50 mg Oral HS PRN Luisa Sood MD         Risks, benefits and possible side effects of Medications:   Risks, benefits, and possible side effects of medications explained to patient and patient verbalizes understanding  This note has been constructed using a voice recognition system  There may be translation, syntax, or grammatical errors  If you have any questions, please contact the dictating provider  CHRISS Smith    Psychiatry PGY-1

## 2021-02-09 NOTE — CASE MANAGEMENT
Called Annie Gomez pt's ex-fiance at [phone]; inquired about  details  Danial stated she was unable to visit the family therefore does not have confirmation  Danial says she will call back this writer once has confirms details of the  service  CM to follow up

## 2021-02-09 NOTE — TREATMENT TEAM
02/09/21 0849   Team Meeting   Meeting Type Daily Rounds   Team Members Present   Team Members Present Physician;Nurse;; Other (Discipline and Name)   Physician Team Member Dr LOAIZA   Nursing Team Member Beto and CLARK FORTE [facility] Management Team Member Dwayne Mehta   Other (Discipline and Name) Martinez and residents   Patient/Family Present   Patient Present No   Patient's Family Present No     303, slow improvement as pt is less preoccupied with romantic delusions  Pt is compliant with meds and meals  Pt does not attend any groups  Pt was given Robaxin prn for shoulder pain  Continue med management  Continue to monitor  Discharge Friday

## 2021-02-09 NOTE — NURSING NOTE
Pt stated he was having pain and muscle spasm in his left shoulder  PRN robaxin was given @ 8658  Will monitor

## 2021-02-09 NOTE — PROGRESS NOTES
Progress Note - Behavioral Health   Alfredo Knuz 54 y o  male MRN: [id number]  Unit/Bed#: [facility] 344-01 Encounter: [id number]    Assessment/Plan   Principal Problem:    Bipolar 1 disorder ([address] )    Alfredo Kunz is a 54year old male with a primary psychiatric diagnosis of Bipolar I  He is improving and although present, is less preoccupied with grandiose thoughts and chronic delusions regarding the police  He reports he is going to "put it all in the past" as he does not want to be re-admitted and focus on getting   He denies any SI/HI/AVH or thoughts of self harm  He is goal oriented when asked about plans of discharge with plans of moving to [facility], obtaining a job, and getting   He continues to have poor insight into his psychiatric condition  Discharge plan 21 pending patient continues to improve/ is clinically stable       · Continue Depakote 1500 mg   · Depakote level of 102 4 ug/mL , within therapeutic range   · Continue Olanzapine 20 mg q h s   · No reported side effects  · Discussed with patient importance of taking medications upon discharge  Patient expresses he will see how he feels  Refuses suggestion of IM medication "is afraid of needles"  · Broset assessment completed:  · Confused: 0  · Irritable: 0  · Boisterous: 0  · Verbal threats: 0   · Physical threats: 0  · Patient attack objects: 0  · Total Score of 0, the risk of violence within the next 24 hours is small  · Depression scale pending   · Continue with pharmacotherapy, group, milieu, and occupational therapy  · Case management to follow-up with family to determine date of son's   · Continue medical management by primary team   · Discharge disposition:  Patient evidences slow but steady improvement, discharge planning for either  or   Subjective: The patient was evaluated this morning for continuity of care and no acute distress noted throughout the evaluation  He is compliant with medications   Over the past 24 hours the patient has been mostly in his room and noted to be less pressured with no delusional content noted during staff interactions  He received PRN Robaxen 500 mg in the evening and tylenol 975 mg at 0015 for shoulder pain  Today on exam the patient was pleasant and cooperative and reports "pretty good" mood  He endorses left shoulder pain for which he received p r n  Robaxin with mild relief  He notes that he is hoping to attend his son's , with current information indicating it is Saturday  Last night, the patient reports he did not sleep well, ~4 hours, due to left shoulder pain  His appetite is normal and he reports eating all his meals  Energy level is reported as "dragging a little"  He denies any SI/HI/AVH or thoughts of self harm  Patient continues to express ideation of collusion amongst local police officers but reports he plans to "put it all in the past" on discharge and will focus on obtaining a new job and relocating to [place], [place]      Current Medications:  Current Facility-Administered Medications   Medication Dose Route Frequency Provider Last Rate    acetaminophen  650 mg Oral Q6H PRN Chiki Washington MD      acetaminophen  650 mg Oral Q4H PRN Chiki Washington MD      acetaminophen  975 mg Oral Q6H PRN Chiki Washington MD      aluminum-magnesium hydroxide-simethicone  30 mL Oral Q4H PRN Chiki Washington MD      benztropine  1 mg Intramuscular Q1H PRN Chiki Washington MD      benztropine  1 mg Oral Q1H PRN Chiki Washington MD      divalproex sodium  1,500 mg Oral HS Alena Hunter DO      hydrOXYzine HCL  25 mg Oral Q4H PRN Chiki Washington MD      ibuprofen  400 mg Oral [facility] Teresa Palumbo MD      LORazepam  2 mg Intramuscular Q4H PRN Chiki Washington MD      LORazepam  1 mg Oral Q4H PRN Chiki Washington MD      magnesium hydroxide  30 mL Oral Daily PRN Chiki Washington MD      methocarbamol  500 mg Oral Q6H PRN Asael Renee, CRNP      nicotine  1 patch Transdermal Daily PRN Bruno Foote MD      OLANZapine  20 mg Oral HS Shanell Borer, DO      Or    OLANZapine  10 mg Intramuscular HS Shanell Borer, DO      OLANZapine  10 mg Intramuscular Q3H PRN Bruno Foote MD      OLANZapine  10 mg Oral Q3H PRN Bruno Foote MD      OLANZapine  5 mg Oral Q3H PRN Bruno Foote MD      OLANZapine  7 5 mg Oral Q3H PRN Bruno Foote MD      pantoprazole  20 mg Oral Daily Bruno Foote MD      traZODone  50 mg Oral HS PRN Bruno Foote MD         Behavioral Health Medications:   all current active meds have been reviewed, continue current psychiatric medications and current meds:   Current Facility-Administered Medications   Medication Dose Route Frequency    acetaminophen (TYLENOL) tablet 650 mg  650 mg Oral Q6H PRN    acetaminophen (TYLENOL) tablet 650 mg  650 mg Oral Q4H PRN    acetaminophen (TYLENOL) tablet 975 mg  975 mg Oral Q6H PRN    aluminum-magnesium hydroxide-simethicone (MYLANTA) oral suspension 30 mL  30 mL Oral Q4H PRN    benztropine (COGENTIN) injection 1 mg  1 mg Intramuscular Q1H PRN    benztropine (COGENTIN) tablet 1 mg  1 mg Oral Q1H PRN    divalproex sodium (DEPAKOTE ER) 24 hr tablet 1,500 mg  1,500 mg Oral HS    hydrOXYzine HCL (ATARAX) tablet 25 mg  25 mg Oral Q4H PRN    ibuprofen (MOTRIN) tablet 400 mg  400 mg Oral Q8H [facility]    LORazepam (ATIVAN) injection 2 mg  2 mg Intramuscular Q4H PRN    LORazepam (ATIVAN) tablet 1 mg  1 mg Oral Q4H PRN    magnesium hydroxide (MILK OF MAGNESIA) oral suspension 30 mL  30 mL Oral Daily PRN    methocarbamol (ROBAXIN) tablet 500 mg  500 mg Oral Q6H PRN    nicotine (NICODERM CQ) 14 mg/24hr TD 24 hr patch 1 patch  1 patch Transdermal Daily PRN    OLANZapine (ZyPREXA ZYDIS) dispersible tablet 20 mg  20 mg Oral HS    Or    OLANZapine (ZyPREXA) IM injection 10 mg  10 mg Intramuscular HS    OLANZapine (ZyPREXA) IM injection 10 mg  10 mg Intramuscular Q3H PRN    OLANZapine (ZyPREXA) tablet 10 mg  10 mg Oral Q3H PRN    OLANZapine (ZyPREXA) tablet 5 mg  5 mg Oral Q3H PRN    OLANZapine (ZyPREXA) tablet 7 5 mg  7 5 mg Oral Q3H PRN    pantoprazole (PROTONIX) EC tablet 20 mg  20 mg Oral Daily    traZODone (DESYREL) tablet 50 mg  50 mg Oral HS PRN         Vital signs in last 24 hours:  Temp:  [97 9 °F (36 6 °C)-98 1 °F (36 7 °C)] 97 9 °F (36 6 °C)  HR:  [75-80] 80  Resp:  [16] 16  BP: (131-160)/(78-96) 160/96    Laboratory results:    Depakote:   Lab Results   Component Value Date    VALPROICTOT 102 4 02/08/2021       Psychiatric Review of Systems:  Behavior over the last 24 hours:  unchanged  Sleep: Reports 4 hours of sleep due to shoulder pain  Appetite: normal  Medication side effects: Yes: "groggy" due to pain medicaiton  ROS: left shoulder pain, no other complaints     Mental Status Evaluation:  Appearance:  age appropriate, tattooed and hygenic, kept facial hair, sitting upright in bed, wearing hospital clothing    Behavior:  pleasant, cooperative   Speech:  normal volume and less pressured and tangeital    Mood:  "pretty good"   Affect:  blunted   Language Coherent, spontaneous   Thought Process:  improving perserverative and tangential thought process   Thought Content:  chronic delusions, grandiose   Perceptual Disturbances: Denies auditory and visual hallucinations, does not appear to be responding to internal stimuli   Risk Potential: Denies any SI/HI or thoughts of self harm   Sensorium:  person, place, time/date and situation   Cognition:  recent and remote memory grossly intact   Consciousness:  alert and awake    Attention: attention span and concentration were age appropriate   Insight:  limited   Judgment: limited   Intellect Appears to be of average intelligence    Gait/Station: normal gait/station   Motor Activity: no abnormal movements     Memory: Short and long term memory  intact     Progress Toward Goals: Consider discharge on 02/11/21 or 02/12/21 pending stable/improving condition  Recommended Treatment:   See above for assessment and plan  Continue following current medications:   Current Facility-Administered Medications   Medication Dose Route Frequency Provider Last Rate    acetaminophen  650 mg Oral Q6H PRN Nadia Lake MD      acetaminophen  650 mg Oral Q4H PRN Nadia Lake MD      acetaminophen  975 mg Oral Q6H PRN Nadia Lake MD      aluminum-magnesium hydroxide-simethicone  30 mL Oral Q4H PRN Nadia Lake MD      benztropine  1 mg Intramuscular Q1H PRN Nadia Lake MD      benztropine  1 mg Oral Q1H PRN Nadia Lake MD      divalproex sodium  1,500 mg Oral HS Renate Payne,       hydrOXYzine HCL  25 mg Oral Q4H PRN Nadia Lake MD      ibuprofen  400 mg Oral [facility] Benny Cruz MD      LORazepam  2 mg Intramuscular Q4H PRN Nadia Lake MD      LORazepam  1 mg Oral Q4H PRN Nadia Lake MD      magnesium hydroxide  30 mL Oral Daily PRN Nadia Lake MD      methocarbamol  500 mg Oral Q6H PRN [employer], CRNP      nicotine  1 patch Transdermal Daily PRN Nadia Lake MD      OLANZapine  20 mg Oral HS Jean Carlos Bucca, DO      Or    OLANZapine  10 mg Intramuscular HS Jean Carlos Bucca, DO      OLANZapine  10 mg Intramuscular Q3H PRN Nadia Lake MD      OLANZapine  10 mg Oral Q3H PRN Nadia Lake MD      OLANZapine  5 mg Oral Q3H PRN Nadia Lake MD      OLANZapine  7 5 mg Oral Q3H PRN Nadia Lake MD      pantoprazole  20 mg Oral Daily Nadia Lake MD      traZODone  50 mg Oral HS PRN Nadia Lake MD         Risks, benefits and possible side effects of Medications:   Risks, benefits, and possible side effects of medications explained to patient and patient verbalizes understanding  This note has been constructed using a voice recognition system    There may be translation, syntax, or grammatical errors  If you have any questions, please contact the dictating provider      [address] [place] [address] Student

## 2021-02-09 NOTE — NURSING NOTE
Patient is looking forward to discharge this week  Pleasant on approach  Cooperative  Mainly seclusive to his room  Denies all symptoms

## 2021-02-10 PROCEDURE — 99232 SBSQ HOSP IP/OBS MODERATE 35: CPT | Performed by: PSYCHIATRY & NEUROLOGY

## 2021-02-10 RX ORDER — IBUPROFEN 600 MG/1
600 TABLET ORAL EVERY 8 HOURS SCHEDULED
Status: DISCONTINUED | OUTPATIENT
Start: 2021-02-10 | End: 2021-02-12 | Stop reason: HOSPADM

## 2021-02-10 RX ADMIN — IBUPROFEN 400 MG: 400 TABLET ORAL at 13:08

## 2021-02-10 RX ADMIN — IBUPROFEN 400 MG: 400 TABLET ORAL at 05:48

## 2021-02-10 RX ADMIN — METHOCARBAMOL 500 MG: 500 TABLET, FILM COATED ORAL at 14:05

## 2021-02-10 RX ADMIN — IBUPROFEN 600 MG: 600 TABLET, FILM COATED ORAL at 21:21

## 2021-02-10 RX ADMIN — PANTOPRAZOLE SODIUM 20 MG: 20 TABLET, DELAYED RELEASE ORAL at 06:45

## 2021-02-10 RX ADMIN — DIVALPROEX SODIUM 1500 MG: 500 TABLET, EXTENDED RELEASE ORAL at 21:21

## 2021-02-10 RX ADMIN — OLANZAPINE 20 MG: 10 TABLET, ORALLY DISINTEGRATING ORAL at 21:21

## 2021-02-10 RX ADMIN — ACETAMINOPHEN 975 MG: 325 TABLET ORAL at 01:01

## 2021-02-10 NOTE — TREATMENT TEAM
02/10/21 0857   Team Meeting   Meeting Type Daily Rounds   Team Members Present   Team Members Present Physician;Nurse;; Other (Discipline and Name)   Physician Team Member Dr LOAIZA   Nursing Team Member [address] [facility] Management Team Member Makayla Duenas   Other (Discipline and Name) Adriana Syed and resident   Patient/Family Present   Patient Present No   Patient's Family Present No     303, pt calm, cooperative to his treatment at unit  Pt denies symptoms  Pt compliant with meds and meals  Continue med management  Discharge Friday

## 2021-02-10 NOTE — PROGRESS NOTES
Progress Note - Behavioral Health   Stephenie Osborn 54 y o  male MRN: [id number]  Unit/Bed#: [facility] [[email or web address] 344-01 Encounter: [id number]    Assessment/Plan   Principal Problem:    Paranoid schizophrenia (HCC)      · Continue Depakote 1500 mg q d  · Continue Olanzapine 20 mg q h s   · Increased ibuprofen 400 mg to 600 mg t i d  for refractory shoulder pain  · Continue to promote patient participation in therapeutic milieu  · Continue medical management by primary team   · Discharge disposition:  Patient continues to display slow and steady improvement, discharge planning for  pending stable condition  Subjective: The patient was evaluated this morning for continuity of care and no acute distress noted throughout the evaluation  Over the past 24 hours staff noted the patient was cooperative, compliant with medication, and remained mostly in his room  He complained of intense shoulder pain overnight and received scheduled Motrin, heating pad, and p r n  Robaxin  Today on exam the patient was pleasant and cooperative and reports he is looking forward to discharge on Friday so that he can attend his son's    He continues complain of left shoulder pain  Informed patient willing is Motrin to 600 mg t i d  He reports "fine" mood and endorses poor sleep secondary to shoulder pain, good appetite, good energy  He denies suicidal or homicidal ideation  He denies auditory or visual hallucinations  Patient reports on discharge he plans to remain compliant with medication and resist illicit drug use  I he continues to expressed plans to move to [facility] to find a fiancee  Patient continues to show a flat/blunted affect with respect to the recent death of his son      Current Medications:  Current Facility-Administered Medications   Medication Dose Route Frequency Provider Last Rate    acetaminophen  650 mg Oral Q6H PRN Juni Steele MD      acetaminophen  650 mg Oral Q4H PRN Juni Steele MD  acetaminophen  975 mg Oral Q6H PRN Patti Rayo MD      aluminum-magnesium hydroxide-simethicone  30 mL Oral Q4H PRN Patti Rayo MD      benztropine  1 mg Intramuscular Q1H PRN Patti Rayo MD      benztropine  1 mg Oral Q1H PRN Patti Rayo MD      divalproex sodium  1,500 mg Oral HS Jay Karina, DO      hydrOXYzine HCL  25 mg Oral Q4H PRN Patti Rayo MD      ibuprofen  600 mg Oral [facility] Chapin Duffya, DO      LORazepam  2 mg Intramuscular Q4H PRN Patti Rayo MD      LORazepam  1 mg Oral Q4H PRN Patti Rayo MD      magnesium hydroxide  30 mL Oral Daily PRN Patti Rayo MD      methocarbamol  500 mg Oral Q6H PRN SHAYNE Rubio      nicotine  1 patch Transdermal Daily PRN Patti Rayo MD      OLANZapine  20 mg Oral HS Jean Carlos Bucca, DO      Or    OLANZapine  10 mg Intramuscular HS Chapin Novoa, DO      OLANZapine  10 mg Intramuscular Q3H PRN Patti Rayo MD      OLANZapine  10 mg Oral Q3H PRN Patti Rayo MD      OLANZapine  5 mg Oral Q3H PRN Patti Rayo MD      OLANZapine  7 5 mg Oral Q3H PRN Patti Rayo MD      pantoprazole  20 mg Oral Daily Patti Rayo MD      traZODone  50 mg Oral HS PRN Patti Rayo MD         Behavioral Health Medications:   all current active meds have been reviewed, continue current psychiatric medications and current meds:   Current Facility-Administered Medications   Medication Dose Route Frequency    acetaminophen (TYLENOL) tablet 650 mg  650 mg Oral Q6H PRN    acetaminophen (TYLENOL) tablet 650 mg  650 mg Oral Q4H PRN    acetaminophen (TYLENOL) tablet 975 mg  975 mg Oral Q6H PRN    aluminum-magnesium hydroxide-simethicone (MYLANTA) oral suspension 30 mL  30 mL Oral Q4H PRN    benztropine (COGENTIN) injection 1 mg  1 mg Intramuscular Q1H PRN    benztropine (COGENTIN) tablet 1 mg  1 mg Oral Q1H PRN    divalproex sodium (DEPAKOTE ER) 24 hr tablet 1,500 mg  1,500 mg Oral HS    hydrOXYzine HCL (ATARAX) tablet 25 mg  25 mg Oral Q4H PRN    ibuprofen (MOTRIN) tablet 600 mg  600 mg Oral Q8H [facility]    LORazepam (ATIVAN) injection 2 mg  2 mg Intramuscular Q4H PRN    LORazepam (ATIVAN) tablet 1 mg  1 mg Oral Q4H PRN    magnesium hydroxide (MILK OF MAGNESIA) oral suspension 30 mL  30 mL Oral Daily PRN    methocarbamol (ROBAXIN) tablet 500 mg  500 mg Oral Q6H PRN    nicotine (NICODERM CQ) 14 mg/24hr TD 24 hr patch 1 patch  1 patch Transdermal Daily PRN    OLANZapine (ZyPREXA ZYDIS) dispersible tablet 20 mg  20 mg Oral HS    Or    OLANZapine (ZyPREXA) IM injection 10 mg  10 mg Intramuscular HS    OLANZapine (ZyPREXA) IM injection 10 mg  10 mg Intramuscular Q3H PRN    OLANZapine (ZyPREXA) tablet 10 mg  10 mg Oral Q3H PRN    OLANZapine (ZyPREXA) tablet 5 mg  5 mg Oral Q3H PRN    OLANZapine (ZyPREXA) tablet 7 5 mg  7 5 mg Oral Q3H PRN    pantoprazole (PROTONIX) EC tablet 20 mg  20 mg Oral Daily    traZODone (DESYREL) tablet 50 mg  50 mg Oral HS PRN     Vital signs in last 24 hours:  Temp:  [97 5 °F (36 4 °C)-98 °F (36 7 °C)] 97 5 °F (36 4 °C)  HR:  [77-81] 77  Resp:  [16] 16  BP: (145-152)/(87-91) 145/91    Laboratory results:    I have personally reviewed all pertinent laboratory/tests results    Most Recent Labs:   Lab Results   Component Value Date    WBC 6 90 01/25/2021    RBC 5 79 01/25/2021    HGB 16 0 01/25/2021    HCT 48 5 01/25/2021     01/25/2021    RDW 14 3 01/25/2021    NEUTROABS 4 00 01/25/2021    SODIUM 138 01/25/2021    K 4 5 01/25/2021     01/25/2021    CO2 27 01/25/2021    BUN 16 01/25/2021    CREATININE 0 85 01/25/2021    GLUC 102 (H) 01/25/2021    CALCIUM 9 2 01/25/2021    AST 31 01/25/2021    ALT 30 01/25/2021    ALKPHOS 61 01/25/2021    TP 7 2 01/25/2021    ALB 4 1 01/25/2021    TBILI 0 60 01/25/2021    CHOLESTEROL 195 01/25/2021    HDL 47 01/25/2021    TRIG 51 01/25/2021    LDLCALC 138 (H) 01/25/2021    [place] 148 01/25/2021    VALPROICTOT 102 4 02/08/2021    EYT0UEWHORBP 2 239 01/22/2021    RPR Non-Reactive 01/25/2021     Admission Labs:   Admission on 02/01/2021   Component Date Value    Valproic Acid, Total 02/08/2021 102 4        Psychiatric Review of Systems:  Behavior over the last 24 hours:  improved  Sleep:  Poor due to shoulder pain  Appetite: normal  Medication side effects: No  ROS: Left-sided shoulder pain    Mental Status Evaluation:  Appearance:  age appropriate, bearded, casually dressed and tattooed   Behavior:  Pleasant and cooperative   Speech:  pressured and tangential   Mood:  "Fine"   Affect:  blunted   Language naming objects and repeating phrases   Thought Process:  increasingly logical but still tangential thought process   Thought Content:  chronic delusions   Perceptual Disturbances: Denies auditory or visual hallucinations   Risk Potential: Denies suicidal or homicidal ideation   Sensorium:  person, place, time/date and situation   Cognition:  recent and remote memory grossly intact   Consciousness:  alert and awake    Attention: attention span and concentration were age appropriate   Insight:  limited but improving   Judgment: limited but improved   Intellect Average   Gait/Station: normal gait/station and normal balance   Motor Activity: no abnormal movements     Memory: Short and long term memory  intact     Progress Toward Goals:  Improving, discharge planning for 02/12/21    Recommended Treatment:   See above for assessment and plan       Continue following current medications:   Current Facility-Administered Medications   Medication Dose Route Frequency Provider Last Rate    acetaminophen  650 mg Oral Q6H PRN Rachel Cardona MD      acetaminophen  650 mg Oral Q4H PRN Rachel Cardona MD      acetaminophen  975 mg Oral Q6H PRN Rachel Cardona MD      aluminum-magnesium hydroxide-simethicone  30 mL Oral Q4H PRN Rachel Cardona MD      benztropine  1 mg Intramuscular Q1H PRN Ginette Cornea, MD      benztropine  1 mg Oral Q1H PRN Ginette Cornea, MD      divalproex sodium  1,500 mg Oral HS Mike Purple, DO      hydrOXYzine HCL  25 mg Oral Q4H PRN Ginette Cornea, MD      ibuprofen  600 mg Oral [facility] Too Arriagan, DO      LORazepam  2 mg Intramuscular Q4H PRN Ginette Cornea, MD      LORazepam  1 mg Oral Q4H PRN Ginette Cornea, MD      magnesium hydroxide  30 mL Oral Daily PRN Ginette Cornea, MD      methocarbamol  500 mg Oral Q6H PRN Belkis Scriver, CRNP      nicotine  1 patch Transdermal Daily PRN Ginette Cornea, MD      OLANZapine  20 mg Oral HS Jean Carlos Bucca, DO      Or    OLANZapine  10 mg Intramuscular HS Jean Carlos Bucca, DO      OLANZapine  10 mg Intramuscular Q3H PRN Ginette Cornea, MD      OLANZapine  10 mg Oral Q3H PRN Ginette Cornea, MD      OLANZapine  5 mg Oral Q3H PRN Ginette Cornea, MD      OLANZapine  7 5 mg Oral Q3H PRN Ginette Cornea, MD      pantoprazole  20 mg Oral Daily Ginette Cornea, MD      traZODone  50 mg Oral HS PRN Ginette Cornea, MD         Risks, benefits and possible side effects of Medications:   Risks, benefits, and possible side effects of medications explained to patient and patient verbalizes understanding  This note has been constructed using a voice recognition system  There may be translation, syntax, or grammatical errors  If you have any questions, please contact the dictating provider  CHRISS Martinez    Psychiatry PGY-1

## 2021-02-10 NOTE — NURSING NOTE
Patient is in the milieu for needs primarily and he sleeps in between times in his room  He does awaken easily when his name is called  He has requested no prn medication this evening and he has been cooperative with all scheduled medications and has been cooperative and appropriate  He did not voice any delusional ideas this evening

## 2021-02-10 NOTE — NURSING NOTE
Mainly seclusive to his room  Denies symptoms  Looking forward to discharge at the end of the week  Less pressured, no delusional content noted during interaction  Looking forward to discharge

## 2021-02-11 PROCEDURE — 99238 HOSP IP/OBS DSCHRG MGMT 30/<: CPT | Performed by: PSYCHIATRY & NEUROLOGY

## 2021-02-11 PROCEDURE — 99232 SBSQ HOSP IP/OBS MODERATE 35: CPT | Performed by: PSYCHIATRY & NEUROLOGY

## 2021-02-11 RX ORDER — OLANZAPINE 20 MG/1
20 TABLET, ORALLY DISINTEGRATING ORAL
Qty: 30 TABLET | Refills: 1 | Status: SHIPPED | OUTPATIENT
Start: 2021-02-11 | End: 2021-02-11

## 2021-02-11 RX ORDER — OLANZAPINE 10 MG/1
20 TABLET ORAL
Status: DISCONTINUED | OUTPATIENT
Start: 2021-02-11 | End: 2021-02-11

## 2021-02-11 RX ORDER — PANTOPRAZOLE SODIUM 20 MG/1
20 TABLET, DELAYED RELEASE ORAL DAILY
Qty: 30 TABLET | Refills: 1 | Status: SHIPPED | OUTPATIENT
Start: 2021-02-11 | End: 2021-02-11 | Stop reason: SDUPTHER

## 2021-02-11 RX ORDER — PANTOPRAZOLE SODIUM 20 MG/1
20 TABLET, DELAYED RELEASE ORAL DAILY
Qty: 30 TABLET | Refills: 1 | Status: SHIPPED | OUTPATIENT
Start: 2021-02-11 | End: 2021-03-13

## 2021-02-11 RX ORDER — DIVALPROEX SODIUM 500 MG/1
1500 TABLET, EXTENDED RELEASE ORAL
Qty: 90 TABLET | Refills: 1 | Status: SHIPPED | OUTPATIENT
Start: 2021-02-11 | End: 2021-02-11

## 2021-02-11 RX ORDER — DIVALPROEX SODIUM 500 MG/1
1500 TABLET, EXTENDED RELEASE ORAL
Qty: 90 TABLET | Refills: 1 | Status: SHIPPED | OUTPATIENT
Start: 2021-02-11 | End: 2021-03-13

## 2021-02-11 RX ORDER — OLANZAPINE 20 MG/1
20 TABLET, ORALLY DISINTEGRATING ORAL
Qty: 30 TABLET | Refills: 1 | Status: SHIPPED | OUTPATIENT
Start: 2021-02-11 | End: 2021-02-11 | Stop reason: HOSPADM

## 2021-02-11 RX ORDER — ACETAMINOPHEN 325 MG/1
975 TABLET ORAL 3 TIMES DAILY
Status: DISCONTINUED | OUTPATIENT
Start: 2021-02-11 | End: 2021-02-12 | Stop reason: HOSPADM

## 2021-02-11 RX ADMIN — DIVALPROEX SODIUM 1500 MG: 500 TABLET, EXTENDED RELEASE ORAL at 21:36

## 2021-02-11 RX ADMIN — OLANZAPINE 20 MG: 10 TABLET, ORALLY DISINTEGRATING ORAL at 21:36

## 2021-02-11 RX ADMIN — IBUPROFEN 600 MG: 600 TABLET, FILM COATED ORAL at 21:36

## 2021-02-11 RX ADMIN — PANTOPRAZOLE SODIUM 20 MG: 20 TABLET, DELAYED RELEASE ORAL at 06:45

## 2021-02-11 RX ADMIN — IBUPROFEN 600 MG: 600 TABLET, FILM COATED ORAL at 05:59

## 2021-02-11 RX ADMIN — ACETAMINOPHEN 975 MG: 325 TABLET ORAL at 16:11

## 2021-02-11 RX ADMIN — IBUPROFEN 600 MG: 600 TABLET, FILM COATED ORAL at 15:02

## 2021-02-11 RX ADMIN — ACETAMINOPHEN 975 MG: 325 TABLET ORAL at 21:37

## 2021-02-11 NOTE — DISCHARGE SUMMARY
Discharge Summary - Delmar Lew 54 y o  male MRN: [id number]  Unit/Bed#: [facility] 344-01 Encounter: [id number]     This patient was not seen prior to discharge  Admission Date: 01/22/2021    Admission Orders (From admission, onward)     Ordered        02/01/21 0153  Admit Patient to  Once                         Discharge Date: 02/12/21    Attending Psychiatrist: Barbie Lacy MD    Reason for Admission:   Jf Mae is a 54 y o  male, admitted to the inpatient behavioral health unit at [address] [place] [address] as a involuntarily 302 commitment, endorsing paranoid delusions, disorganized thinking, grandiosity, and pressured speech  From Dr Karla Abebe admission H&P:  54 CM who was seen  for an initial evaluation  Patient went to police asking for help and saying that  and detectives are after him  Patient is currently extremely manic  He has marked tangentiality and circumstantiality  He told this writer in elaborate story where he said that he is from a well his age family  And the threes brothers was last name is Henriettalaurel Gongora in [place]  police department after him and out trying to frame him  He told this writer that the police department has tried to frame him and tried to make him look like a padded to her when he is not  He also talked about a person who believes has disappeared and Jennifer Perez is trying to cover that up  Staff reports that patient continues to be disorganized and unable to follow directions however he is redirectable  He has reported at the time of interview and admission that he had a implanted in his bed  When this writer talked to him he said that this could be a possibility however he believes that he was possibly thinking like that  His urine drug screen was positive for meth at the time of admission  He also has mild grandiosity he says a lot of positive things about himself    He has elaborate system of disorganized or organized delusions  He denied any hallucinations suicidal homicidal ideas  PAST PSYCH HISTORY:  Patient says that he was 36, 1 time in the past however this was the same scenario and police was trying to frame him  He denied taking any psychotropic medications in the past he denied seeing an outpatient psychiatrist in the past     Hospital Course: Throughout the patient's hospital course the following medication adjustments were made:   Trialed Seroquel for psychotic symptoms   Trialed Abilify for psychotic symptoms   Titrated and continued Zyprexa for psychotic symptoms   Titrated and continue Depakote for symptoms of dusty  The patient adhered to their medication regimen and denied any acute adverse effects  Their mood brightened throughout the course of psychiatric management and he was seen in [facility] interacting appropriately with peers  Ermelinda Triplett did not demonstrate dangerous behavior to self or their peers his inpatient stay  On the day of discharge Ermelinda Triplett was described as pleasant and cooperative   Augustina Console During my last examination,  he denied any suicidal or homicidal ideation  He denied any auditory or visual hallucinations  He indicated that he would be compliant with his medication at discharge and plans to seek follow-up psychiatric care on outpatient basis  Behavioral Health Medications: all current active meds have been reviewed  Labs/Imaging:    All the lab work and imaging studies reviewed by attending physician    Mental Status as of February 11, 2021  Appearance:  age appropriate, dressed appropriately, looks stated age  sitting comfortably in chair, adequate hygiene and grooming, cooperative with interview, good eye contact    Behavior:  cooperative   Speech:  normal rate, normal volume, normal pitch, fluent, clear and coherent   Mood:  euthymic   Affect:  mood-congruent   Language Within normal limits   Thought Process:  organized, logical, goal directed, normal rate of thoughts   Thought Content:  No delusions   Perceptual Disturbances: No hallucinations   Risk Potential: Denies suicidal ideation   Sensorium:  person, place, time and current situation   Cognition:  Grossly intact   Consciousness:  alert and awake   Attention: attention span and concentration were age appropriate   Insight:  fair   Judgment: fair   Intellect appears to be of average intelligence   Gait/Station: normal gait/station   Motor Activity: no abnormal movements     Discharge Diagnosis:   Patient Active Problem List   Diagnosis    Medical clearance for psychiatric admission    Paranoid schizophrenia [facility])       Discharge Medications:  See list above, as well as the after visit summary containing reconciled discharge medications provided to patient and family  Discharge instructions/Information to patient and family:   See after visit summary for information provided to patient and family  Provisions for Follow-Up Care:  See after visit summary for information related to follow-up care and any pertinent home health orders  This note has been constructed using a voice recognition system  There may be translation, syntax,  or grammatical errors  If you have any questions, please contact the dictating provider

## 2021-02-11 NOTE — CASE MANAGEMENT
Follow up call with Salud Aaruz pt's ex- fiance [employer] [phone]; who advised that she is trying to help Eloise Wyman but can not get much involved as " son's mother 'Gayle' doesn't want Anjalijosé miguel Wyman to be involved because he has not been much into son's life  Therefore, trying keep him away from the  service"  This is the reason Salud Arauz is not providing specific information as family wishes to make this  a "private"  Salud Arauz also stated that is it not right to keep Eloise Wyman away from his son's  regardless of no involvement in son's life  Salud Arauz will not share any specific information besides  service scheduled on this Saturday  However, she is willing to help Eloise Wyman to meet with the family and find a way to get him involved  She says, as long as Eloise De Leonuss comes out on Friday he should be good   services is scheduled for 21  CM briefly advised that patient will be discharged on Friday and will be provided with discharge transport

## 2021-02-11 NOTE — DISCHARGE INSTR - OTHER ORDERS
You will be discharged to your friend's house located at [address] [facility], where your care is parked  You will  your care and belonging then head to a friends / [employer]  Your discharge meds are filled at [address] and are provided to you prior to discharge  Please note that you have ONE refill on these discharge meds  After discharge, if you find your coping skills are not as effective and you continue to feel distressed please call [facility] services are available 24 hours a day by calling [facility] ([facility]) AT [place] at [phone], and [address] [facility] [address] : [phone]    Crisis Text Line : [phone]     If you feel you are a danger to yourself or others please contact 911 or go to nearest Emergency room to seek immediate help  Jessy Rubio RN, our [facility], will be calling you after your discharge, on the phone number that you provided  She will be available as an additional support, if needed  If you wish to speak with her, you may contact Ivette Ordoñez at [phone]  What you need to know aboutcoronavirus disease 2019 (COVID-19)     What is coronavirus disease 2019 (COVID-19)? Coronavirus disease 2019 (COVID-19) is a respiratory illness that can spread from person to person  The virus that causes COVID-19 is a novel coronavirus that was first identified during an investigation into an outbreak in [place]  Can people in [place]  get COVID-19? Yes  COVID-19 is spreading from person to person in parts of the [place]  Risk of infection with COVID-19 is higher for people who are close contacts of someone known to have COVID-19, for example healthcare workers, or household members  Other people at higher risk for infection are those who live in or have recently been in an area with ongoing spread of COVID-19   Learn more about places with ongoing spread at [facility]  html#geographic  Have there been cases of COVID-19 in [place] ?   Yes  The first case of COVID-19 in the [place] was reported on January 21, 2020  The current count of cases of COVID-19 in the [place] is available on [employer] at [facility]  How does COVID-19 spread? The virus that causes COVID-19 probably emerged from an animal source, but is now spreading from person to person  The virus is thought to spread mainly between people who are in close contact with one another (within about 6 feet) through respiratory droplets produced when an infected person coughs or sneezes  It also may be possible that a person can get COVID-19 by touching a surface or object that has the virus on it and then touching their own mouth, nose, or possibly their eyes, but this is not thought to be the main way the virus spreads  Learn what is known about the spread of newly emerged coronaviruses at [facility]  What are the symptoms of COVID-19? Patients with COVID-19 have had mild to severe respiratory illness with symptoms of   fever   cough   shortness of breath  What are severe complications from this virus? Some patients have pneumonia in both lungs, multi-organ failure and in some cases death  How can I help protect myself? People can help protect themselves from respiratory illness with everyday preventive actions  Avoid close contact with people who are sick  Avoid touching your eyes, nose, and mouth withunwashed hands  Wash your hands often with soap and water for at least 20 seconds  Use an alcohol-based hand  that contains at least 60% alcohol if soap and water are not available  If you are sick, to keep from spreading respiratory illness to others, you should   Stay home when you are sick      Cover your cough or sneeze with a tissue, then throw the tissue in the trash  Clean and disinfect frequently touched objectsand surfaces  What should I do if I recently traveled from an area with ongoing spread of COVID-19? If you have traveled from an affected area, there may be restrictions on your movements for up to 2 weeks  If you develop symptoms during that period (fever, cough, trouble breathing), seek medical advice  Call the office of your health care provider before you go, and tell them about your travel and your symptoms  They will give you instructions on how to get care without exposing other people to your illness  While sick, avoid contact with people, don't go out and delay any travel to reduce the possibility of spreading illness to others  Is there a vaccine? There is currently no vaccine to protect against COVID-19  The best way to prevent infection is to take everyday preventive actions, like avoiding close contact with people who are sick and washing your hands often  Is there a treatment? There is no specific antiviral treatment for COVID-19  People with COVID-19 can seek medical care to helprelieve symptoms  For more information: www cdc gov/XLTXQ93MO 600633-T 03/03/2020       What to do if you are sick withcoronavirus disease 2019 (COVID-19)     If you are sick with COVID-19 or suspect you are infected with the virus that causes COVID-19, follow the steps below to help prevent the disease from spreading to people in your home and community  Stay home except to get medical care   You should restrict activities outside your home, except for getting medical care  Do not go to work, school, or public areas  Avoid using public transportation, ride-sharing, or taxis  Separate yourself from other people and animals inyour home  People: As much as possible, you should stay in a specific room and away from other people in your home  Also, you should use a separate bathroom, if available    Animals: Do not handle pets or other animals while sick  See COVID-19 and Animals for more information  Call ahead before visiting your doctor   If you have a medical appointment, call the healthcare provider and tell them that you have or may have COVID-19  This will help the healthcare provider's office take steps to keep other people from getting infected or exposed  Wear a facemask  You should wear a facemask when you are around other people (e g , sharing a room or vehicle) or pets and before you enter a healthcare provider's office  If you are not able to wear a facemask (for example, because it causes trouble breathing), then people who live with you should not stay in the same room with you, or they should wear a facemask if they enteryour room  Cover your coughs and sneezes   Cover your mouth and nose with a tissue when you cough or sneeze  Throw used tissues in a lined trash can; immediately wash your hands with soap and water for at least 20 seconds or clean your hands with an alcohol-based hand  that contains at least 60 to 95% alcohol, covering all surfaces of your hands and rubbing them together until they feel dry  Soap and water should be used preferentially if hands are visibly dirty  Avoid sharing personal household items   You should not share dishes, drinking glasses, cups, eating utensils, towels, or bedding with other people or pets in your home  After using these items, they should be washed thoroughly with soap and water  Clean your hands often  Wash your hands often with soap and water for at least 20 seconds  If soap and water are not available, clean your hands with an alcohol-based hand  that contains at least 60% alcohol, covering all surfaces of your hands and rubbing them together until they feel dry  Soap and water should be used preferentially if hands are visibly dirty  Avoid touching your eyes, nose, and mouth with unwashed hands    Clean all "high-touch" surfaces every day  High touch surfaces include counters, tabletops, doorknobs, bathroom fixtures, toilets, phones, keyboards, tablets, and bedside tables  Also, clean any surfaces that may have blood, stool, or body fluids on them  Use a household cleaning spray or wipe, according to the label instructions  Labels contain instructions for safe and effective use of the cleaning product including precautions you should take when applying the product, such as wearing gloves and making sure you have good ventilation during use of the product  Monitor your symptoms  Seek prompt medical attention if your illness is worsening (e g , difficulty breathing)  Before seeking care, call your healthcare provider and tell them that you have, or are being evaluated for, COVID-19  Put on a facemask before you enter the facility  These steps will help the healthcare provider's office to keep other people in the office or waiting room from getting infectedor exposed  Ask your healthcare provider to call the local or state health department  Persons who are placed under active monitoring or facilitated self-monitoring should follow instructions provided by their local health department or occupational health professionals, as appropriate  If you have a medical emergency and need to call 911, notify the dispatch personnel that you have, or are being evaluated for COVID-19  If possible, put on a facemask before emergency medical services arrive  Discontinuing home isolation  Patients with confirmed COVID-19 should remain under home isolation precautions until the risk of secondary transmission to others is thought to be low  The decision to discontinue home isolation precautions should be made on a case-by-case basis, in consultation with healthcare providers and state and local health departments  For more information: www cdc gov/LSPNF66EZ 520493-H 02/24/2020       Stay home when you are sick,except to get medical care    Wash your hands often with soap and water for at least 20 seconds  Cover your cough or sneeze with a tissue, then throw the tissue in the trash  Clean and disinfect frequently touched objects and surfaces  Avoid touching your eyes, nose, and mouth  STOP THE SPREAD OF GERMS  For more information: www cdc gov/COVID19 Avoid close contact with people who are sick  Help prevent the spread of respiratory diseases like COVID-19  HOW TO GET SUBSTANCE ABUSE HELP:  If you or someone you know has a drug or alcohol problem, there is help:  [address]: [address]: [phone]  An assessment is the first step  In addition to those listed there are other programs available in the area but assessment is best to determine an appropriate level of care  If you DO NOT have Medical Assistance ([place]) or [employer], an assessment can be scheduled at one of these providers:  [address] [place] [address] [address], [address]  [phone]   [address] [facility] [address] , [facility] in [place], [address] [place]  3314 [facility] [address]  [facility], [address]   Step by 8[address] [facility] [address] , [facility] [place], [address] [place] [address] [place] [address] 1320 [facility] , [facility] [place], [address] [place] [address]  2000 [facility],[address] Bh[address] , [address], [facility] in [place], [phone]     If you [address], an assessment can be scheduled at one of these providers:  [facility]  [address] , [facility] [place], [address] [place] [address]  [address]  [address], [address]  [address]  D&A Intake Unit  [address] [facility]  [address] , 1st Floor, [place], [address]  [address] [facility] [address], 300 [facility],6Th Floor, [address] 351 [address] [facility] , [place], [address]  [place] [address]  [place], 122 [facility]) New [address], [place], [address] [place], [address]   Step by 8012 [facility] [address] , [place], 98 [facility] [address] 1320 [facility] , [place], 98 [facility]  2000 [facility],[address] Bhup[address] Cowan , [address], [place], [address] [facility] [phone]     If you W [employer], an assessment can be scheduled at one of these providers  Please contact these Providers to determine if they are in your network plan:    [facility]  620 [facility] [address] , 1st Floor, [place], [address]  [facility] [address] , [place], [address]  [address]  [place], 122 [facility]) New [address], [place] [place], 102 E [facility] One [facility] 111 [address]up[address] Cow[address] , [address], [place], [address] [facility]  [address]ia [address] [address]  Phone : [phone]

## 2021-02-11 NOTE — NURSING NOTE
Patient remains mostly seclusive to his room  Out for meals  Denies symptoms  No delusional content noted and is looking forward to discharge tomorrow

## 2021-02-11 NOTE — BH TRANSITION RECORD
Contact Information: If you have any questions, concerns, pended studies, tests and/or procedures, or emergencies regarding your inpatient behavioral health visit  Please contact [facility] behavioral health unit 3B [phone]  and ask to speak to a , nurse or physician  A contact is available 24 hours/ 7 days a week at this number  Summary of Procedures Performed During your Stay:  Below is a list of major procedures performed during your hospital stay and a summary of results:  - Cardiac Procedures/Studies: EKG  - Major Imaging Studies: XR Chest PA * Lateral     Pending Studies (From admission, onward)    None        If studies are pending at discharge, follow up with your PCP and/or referring provider

## 2021-02-11 NOTE — NURSING NOTE
Patient has been sleeping and seclusive to room this evening  He is not verbalizing delusional material  He received increased dose 600mgs Motrin this evening for severe pain with effect

## 2021-02-11 NOTE — CASE MANAGEMENT
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"  Pt also notes that if doctor wants him to stay her few days that's fine with him  CM briefly discussed about his  son's mother and his ex do not want patient to be involved in the   To which patient replied "I am the father, I will see who tells me not to see my son on his   I will talk to the family and find a way  But I will go attend the "  Discussed importance of outpatient treatment for D/A and   Pt declined referral for D/A saying "I am good  It was just one time thing  I have stayed clean for over 11 years  I know how to take care of myself"  However, pt is willing to follow up with [facility] and has signed JACQUIE for [facility]  CM to make appropriate follow up appointment  Pt denied having SI, HI and AVH  Pt appeared calm, cooperative, grandiose at times  Discharge Friday via Colten Verma  Continue to monitor

## 2021-02-11 NOTE — PLAN OF CARE
Problem: DEPRESSION  Goal: Will be euthymic at discharge  Description: INTERVENTIONS:  - Administer medication as ordered  - Provide emotional support via 1:1 interaction with staff  - Encourage involvement in milieu/groups/activities  - Monitor for social isolation  Outcome: Adequate for Discharge     Problem: PSYCHOSIS  Goal: Will report no hallucinations or delusions  Description: Interventions:  - Administer medication as  ordered  - Every waking shifts and PRN assess for the presence of hallucinations and or delusions  - Assist with reality testing to support increasing orientation  - Assess if patient's hallucinations or delusions are encouraging self-harm or harm to others and intervene as appropriate  Outcome: Adequate for Discharge     Problem: SUBSTANCE USE/ABUSE  Goal: Will have no detox symptoms and will verbalize plan for changing substance-related behavior  Description: INTERVENTIONS:  - Monitor physical status and assess for symptoms of withdrawal  - Administer medication as ordered  - Provide emotional support with 1 on 1 interaction with staff  - Encourage recovery focused program/ addiction education  - Assess for verbalization of changing behaviors related to substance abuse  - Initiate consults and referrals as appropriate (Case Management, Spiritual Care, etc )  Outcome: Adequate for Discharge  Goal: By discharge, will develop insight into their chemical dependency and sustain motivation to continue in recovery  Description: INTERVENTIONS:  - Attends all daily group sessions and scheduled AA groups  - Actively practices coping skills through participation in the therapeutic community and adherence to program rules  - Reviews and completes assignments from individual treatment plan  - Assist patient development of understanding of their personal cycle of addiction and relapse triggers  Outcome: Adequate for Discharge  Goal: By discharge, patient will have ongoing treatment plan addressing chemical dependency  Description: INTERVENTIONS:  - Assist patient with resources and/or appointments for ongoing recovery based living  Outcome: Adequate for Discharge     Problem: INVOLUNTARY ADMIT  Goal: Will cooperate with staff recommendations and doctor's orders and will demonstrate appropriate behavior  Description: INTERVENTIONS:  - Treat underlying conditions and offer medication as ordered  - Educate regarding involuntary admission procedures and rules  - Utilize positive consistent limit setting strategies to support patient and staff safety  Outcome: Adequate for Discharge     Problem: Ineffective Coping  Goal: Participates in unit activities  Description: Interventions:  - Provide therapeutic environment   - Provide required programming   - Redirect inappropriate behaviors   Outcome: Adequate for Discharge     Problem: DISCHARGE PLANNING  Goal: Discharge to home or other facility with appropriate resources  Description: INTERVENTIONS:  - Identify barriers to discharge w/patient and caregiver  - Arrange for needed discharge resources and transportation as appropriate  - Identify discharge learning needs (meds, wound care, etc )  - Arrange for interpretive services to assist at discharge as needed  - Refer to Case Management Department for coordinating discharge planning if the patient needs post-hospital services based on physician/advanced practitioner order or complex needs related to functional status, cognitive ability, or social support system  Outcome: Adequate for Discharge

## 2021-02-11 NOTE — PLAN OF CARE
Problem: Ineffective Coping  Goal: Participates in unit activities  Description: Interventions:  - Provide therapeutic environment   - Provide required programming   - Redirect inappropriate behaviors   2/11/2021 1442 by Yahaira Galindo  Outcome: Not Progressing  2/11/2021 1441 by Yahaira Galindo  Outcome: Not Progressing

## 2021-02-11 NOTE — DISCHARGE INSTRUCTIONS
Schizophrenia   WHAT YOU NEED TO KNOW:   Schizophrenia is a long-term mental disease that affects how your brain works  It is a disease that may change how you think, feel, and behave  You may not be able to know what is real and what is not real  Your thoughts may not be clear, or may jump from one topic to another  DISCHARGE INSTRUCTIONS:   Medicines:   · Antipsychotics: These help decrease psychotic symptoms and severe agitation  You may need antiparkinson medicine to control muscle stiffness, twitches, and restlessness caused by antipsychotic medicines  · Antianxiety medicine: This medicine may be given to decrease anxiety and help you feel calm and relaxed  · Antidepressants: These help with symptoms of depression and anxiety  · Mood stabilizers: These control mood swings  · Tranquilizers: These increase feelings of being calm and relaxed  · Take your medicine as directed  Contact your healthcare provider if you think your medicine is not helping or if you have side effects  Tell him or her if you are allergic to any medicine  Keep a list of the medicines, vitamins, and herbs you take  Include the amounts, and when and why you take them  Bring the list or the pill bottles to follow-up visits  Carry your medicine list with you in case of an emergency  Follow up with your healthcare provider or psychiatrist as directed:  Write down your questions so you remember to ask them during your visits  Treatment settings: You may need to continue your treatments after you leave the hospital  You may be treated in the following programs:  · Crisis residential program:  This is a program where you live in a home-care facility  Healthcare providers work in these homes just like in hospitals  This program is helpful especially when you are having a relapse (your symptoms return)  · Day treatment program:  This program provides a chance to learn and practice skills   This also provides long-term support so you may have an improved quality of life  · Outpatient program:  An outpatient program is when you meet regularly with your therapist  [address] may meet one-to-one with your therapist, or you might meet with your therapist in a group  · Partial care program:  A partial care program is also called day hospitalization or partial hospitalization  This is group therapy and lasts 4 to 6 hours a day, 3 to 5 days a week  It may help you avoid going into the hospital or help you get out of the hospital sooner  It may also help you get symptoms under control and avoid a relapse  Therapy:   · Assertive community treatment:  A team of healthcare providers or psychiatrists and support groups in your community help you with your therapy  · Cognitive behavior therapy: This therapy helps you to change certain behaviors  It will help you handle symptoms such as hallucinations and delusions  · Illness-management skills: This type of therapy teaches you what you can do to help manage your disease  · Family psychoeducation:  Your family will be part of your therapy  · Social skills training: This training helps you learn how to get along with other people  · Supported employment: This is a form of therapy where you are placed into a job that fits your skills  It will help give you independence and self-confidence  Manage your symptoms:   · Do not stop taking your medicines:  Tell your healthcare provider or psychiatrist if you have any problems with or questions about your medicines  · Do not stop your therapies: It is normal to have doubts about or feel discomfort with your therapy  Tell your healthcare provider or psychiatrist if you are not comfortable or have questions about your therapies  · Get regular sleep:  Try to get 6 to 8 hours of sleep each night  Tell your healthcare provider or psychiatrist if you are not able to sleep, or if you are sleeping too much      · Do not drink alcohol:  Alcohol interacts with medicine used to treat schizophrenia  For support and more information:   · [employer] [facility]  [address] [facility]  , [address] [facility] , [address] [facility]  Phone: 0- [phone]  Phone: [phone]  Web Address: http://www yang com/  [employer]    · [address] [facility], Office of [employer]  [address] [facility], [address] [place] [address] [place], [address]  [place], [place] [address]   Phone: [phone]  Phone: [phone]  Web Address: [facility]  Contact your healthcare provider or psychiatrist if:   · You feel that you are having symptoms of schizophrenia  · You are not able to sleep well, or are sleeping more than usual     · You cannot eat or are eating more than usual     · You have questions or concerns about your condition or care  Seek care immediately or call 911 if:   · You think about killing yourself or someone else  · You have a rash, swelling, or trouble breathing after you take your medicine  © Copyright [address] Information is for End User's use only and may not be sold, redistributed or otherwise used for commercial purposes  All illustrations and images included in CareNotes® are the copyrighted property of [employer]  or [address] [place] [address]  The above information is an  only  It is not intended as medical advice for individual conditions or treatments  Talk to your doctor, nurse or pharmacist before following any medical regimen to see if it is safe and effective for you

## 2021-02-11 NOTE — PROGRESS NOTES
Progress Note - Behavioral Health   Stephenie Osborn 54 y o  male MRN: [id number]  Unit/Bed#: U 344-01 Encounter: [id number]    Assessment/Plan   Principal Problem:    Paranoid schizophrenia (HCC)      · Continue Depakote 1500 mg q d  · Continue Olanzapine 20 mg q h s  · Continue ibuprofen 600 mg t i d  for refractory shoulder pain  · Added acetaminophen 975 mg t i d  for refractory shoulder pain  · Continue to promote patient participation in therapeutic milieu  · Continue medical management by primary team   · Discharge disposition:  Patient continues to display slow and steady improvement, discharge planning for  pending stable condition  Subjective: The patient was evaluated this morning for continuity of care and no acute distress noted throughout the evaluation  Over the past 24 hours staff noted the patient was mostly seclusive to his room without evidence of any psychotic symptoms, continued to complain about shoulder pain, and compliant with medication  Today on exam the patient was pleasant and cooperative and reports continued severe shoulder pain which is limiting his sleep  He reports "fine" mood and endorses poor sleep secondary to shoulder pain, good appetite and good energy  He denies any suicidal or homicidal ideation  He denies any auditory or visual hallucinations  Patient states he is looking forward to discharge tomorrow as his son's  is on Saturday  He reports that he plans to be compliant with medication at the time of discharge and hopes to obtain employment and relocate to [facility] in the near future      Current Medications:  Current Facility-Administered Medications   Medication Dose Route Frequency Provider Last Rate    acetaminophen  975 mg Oral TID Corona Cobian DO      aluminum-magnesium hydroxide-simethicone  30 mL Oral Q4H PRN Juni Steele MD      benztropine  1 mg Intramuscular Q1H PRN Juni Steele MD      benztropine  1 mg Oral Q1H PRN Lucas Nieves MD      divalproex sodium  1,500 mg Oral HS Harsh Gloss, DO      hydrOXYzine HCL  25 mg Oral Q4H PRN Lucas Nieves MD      ibuprofen  600 mg Oral [facility] Elle Dumont DO      LORazepam  2 mg Intramuscular Q4H PRN Lucas Nieves MD      LORazepam  1 mg Oral Q4H PRN Lucas Nieves MD      magnesium hydroxide  30 mL Oral Daily PRN Lucas Nieves MD      methocarbamol  500 mg Oral Q6H PRN SHAYNE Mcdonnell      nicotine  1 patch Transdermal Daily PRN Lucas Nieves MD      OLANZapine  20 mg Oral HS Jean Carlos Bucca, DO      Or    OLANZapine  10 mg Intramuscular HS Elle Dumont DO      OLANZapine  10 mg Intramuscular Q3H PRN Lucas Nieves MD      OLANZapine  10 mg Oral Q3H PRN Lucas Nieves MD      OLANZapine  5 mg Oral Q3H PRN Lucas Nieves MD      OLANZapine  7 5 mg Oral Q3H PRN Lucas Nieves MD      pantoprazole  20 mg Oral Daily Lucas Nieves MD      traZODone  50 mg Oral HS PRN Lucas Nieves MD         Behavioral Health Medications:   all current active meds have been reviewed, continue current psychiatric medications and current meds:   Current Facility-Administered Medications   Medication Dose Route Frequency    acetaminophen (TYLENOL) tablet 975 mg  975 mg Oral TID    aluminum-magnesium hydroxide-simethicone (MYLANTA) oral suspension 30 mL  30 mL Oral Q4H PRN    benztropine (COGENTIN) injection 1 mg  1 mg Intramuscular Q1H PRN    benztropine (COGENTIN) tablet 1 mg  1 mg Oral Q1H PRN    divalproex sodium (DEPAKOTE ER) 24 hr tablet 1,500 mg  1,500 mg Oral HS    hydrOXYzine HCL (ATARAX) tablet 25 mg  25 mg Oral Q4H PRN    ibuprofen (MOTRIN) tablet 600 mg  600 mg Oral Q8H [address]    LORazepam (ATIVAN) injection 2 mg  2 mg Intramuscular Q4H PRN    LORazepam (ATIVAN) tablet 1 mg  1 mg Oral Q4H PRN    magnesium hydroxide (MILK OF MAGNESIA) oral suspension 30 mL  30 mL Oral Daily PRN    methocarbamol (ROBAXIN) tablet 500 mg  500 mg Oral Q6H PRN    nicotine (NICODERM CQ) 14 mg/24hr TD 24 hr patch 1 patch  1 patch Transdermal Daily PRN    OLANZapine (ZyPREXA ZYDIS) dispersible tablet 20 mg  20 mg Oral HS    Or    OLANZapine (ZyPREXA) IM injection 10 mg  10 mg Intramuscular HS    OLANZapine (ZyPREXA) IM injection 10 mg  10 mg Intramuscular Q3H PRN    OLANZapine (ZyPREXA) tablet 10 mg  10 mg Oral Q3H PRN    OLANZapine (ZyPREXA) tablet 5 mg  5 mg Oral Q3H PRN    OLANZapine (ZyPREXA) tablet 7 5 mg  7 5 mg Oral Q3H PRN    pantoprazole (PROTONIX) EC tablet 20 mg  20 mg Oral Daily    traZODone (DESYREL) tablet 50 mg  50 mg Oral HS PRN     Vital signs in last 24 hours:  Temp:  [97 2 °F (36 2 °C)-97 8 °F (36 6 °C)] 97 2 °F (36 2 °C)  HR:  [69-77] 69  Resp:  [16] 16  BP: (128-142)/(67-97) 128/67    Laboratory results:    I have personally reviewed all pertinent laboratory/tests results    Most Recent Labs:   Lab Results   Component Value Date    WBC 6 90 01/25/2021    RBC 5 79 01/25/2021    HGB 16 0 01/25/2021    HCT 48 5 01/25/2021     01/25/2021    RDW 14 3 01/25/2021    NEUTROABS 4 00 01/25/2021    SODIUM 138 01/25/2021    K 4 5 01/25/2021     01/25/2021    CO2 27 01/25/2021    BUN 16 01/25/2021    CREATININE 0 85 01/25/2021    GLUC 102 (H) 01/25/2021    CALCIUM 9 2 01/25/2021    AST 31 01/25/2021    ALT 30 01/25/2021    ALKPHOS 61 01/25/2021    TP 7 2 01/25/2021    ALB 4 1 01/25/2021    TBILI 0 60 01/25/2021    CHOLESTEROL 195 01/25/2021    HDL 47 01/25/2021    TRIG 51 01/25/2021    LDLCALC 138 (H) 01/25/2021    NONHDLC 148 01/25/2021    VALPROICTOT 102 4 02/08/2021    [id number] 2 239 01/22/2021    RPR Non-Reactive 01/25/2021     Admission Labs:   Admission on 02/01/2021   Component Date Value    Valproic Acid, Total 02/08/2021 102 4        Psychiatric Review of Systems:  Behavior over the last 24 hours:  improved  Sleep:  Poor secondary to shoulder pain  Appetite: normal  Medication side effects: No  ROS: Left-sided shoulder pain    Mental Status Evaluation:  Appearance:  age appropriate, bearded, casually dressed and tattooed   Behavior:  Pleasant and cooperative   Speech:  pressured but less tangential   Mood:  "fine"   Affect:  blunted   Language naming objects and repeating phrases   Thought Process:  goal directed and logical   Thought Content:  chronic delusions   Perceptual Disturbances: Denies auditory or visual hallucinations  Does not appear to be responding to internal stimuli  Risk Potential: Denies suicidal or homicidal ideation   Sensorium:  person, place and situation   Cognition:  recent and remote memory grossly intact   Consciousness:  alert and awake    Attention: attention span and concentration were age appropriate   Insight:  limited but improving   Judgment: fair   Intellect Average   Gait/Station: normal gait/station and normal balance   Motor Activity: no abnormal movements     Memory: Short and long term memory  intact     Progress Toward Goals:  Plan for discharge on 02/12/21 pending stable condition    Recommended Treatment:   See above for assessment and plan       Continue following current medications:   Current Facility-Administered Medications   Medication Dose Route Frequency Provider Last Rate    acetaminophen  975 mg Oral TID Romina Worley, DO      aluminum-magnesium hydroxide-simethicone  30 mL Oral Q4H PRN Tatum Smith MD      benztropine  1 mg Intramuscular Q1H PRN Tatum Smith MD      benztropine  1 mg Oral Q1H PRN Tatum Smith MD      divalproex sodium  1,500 mg Oral HS Zaheer Love, DO      hydrOXYzine HCL  25 mg Oral Q4H PRN Tatum Smith MD      ibuprofen  600 mg Oral Q8H [address] Jean Carlos Maloney, DO      LORazepam  2 mg Intramuscular Q4H PRN Tatum Smith MD      LORazepam  1 mg Oral Q4H PRN Tatum Smith MD      magnesium hydroxide  30 mL Oral Daily PRN Tatum Smith MD      methocarbamol  500 mg Oral Q6H PRN Dorean Kanner SHAYNE Barros      nicotine  1 patch Transdermal Daily PRN Luisa Sood MD      OLANZapine  20 mg Oral HS Jean Carlos Bucca, DO      Or    OLANZapine  10 mg Intramuscular HS Jean Carlos Maloney, DO      OLANZapine  10 mg Intramuscular Q3H PRN Luisa Sood MD      OLANZapine  10 mg Oral Q3H PRN Luisa Sodo MD      OLANZapine  5 mg Oral Q3H PRN Luisa Sood MD      OLANZapine  7 5 mg Oral Q3H PRN Luisa Sood MD      pantoprazole  20 mg Oral Daily Luisa Sood MD      traZODone  50 mg Oral HS PRN Luisa Sood MD         Risks, benefits and possible side effects of Medications:   Risks, benefits, and possible side effects of medications explained to patient and patient verbalizes understanding  This note has been constructed using a voice recognition system  There may be translation, syntax, or grammatical errors  If you have any questions, please contact the dictating provider  CHRISS Nova    Psychiatry PGY-1

## 2021-02-11 NOTE — TREATMENT TEAM
02/11/21 0911   Team Meeting   Meeting Type Daily Rounds   Team Members Present   Team Members Present Physician;Nurse;; Other (Discipline and Name)   Physician Team Member Dr LOAIZA   Nursing Team Member [address] [facility] Management Team Member Medina Felton   Other (Discipline and Name) Martinez and residents   Patient/Family Present   Patient Present No   Patient's Family Present No     303, pt mainly to his room  Pt compliant with meds and meals  Pt received increase dosage of motrin for severe pain  Continue med management  Discharge tomorrow via Govind Lacey

## 2021-02-11 NOTE — PLAN OF CARE
Pt's compliant with meds and routine care  Pt denies symptoms  Discharge Friday via Martha Gomez         Problem: DEPRESSION  Goal: Will be euthymic at discharge  Description: INTERVENTIONS:  - Administer medication as ordered  - Provide emotional support via 1:1 interaction with staff  - Encourage involvement in milieu/groups/activities  - Monitor for social isolation  Outcome: Progressing     Problem: PSYCHOSIS  Goal: Will report no hallucinations or delusions  Description: Interventions:  - Administer medication as  ordered  - Every waking shifts and PRN assess for the presence of hallucinations and or delusions  - Assist with reality testing to support increasing orientation  - Assess if patient's hallucinations or delusions are encouraging self-harm or harm to others and intervene as appropriate  Outcome: Progressing     Problem: SUBSTANCE USE/ABUSE  Goal: Will have no detox symptoms and will verbalize plan for changing substance-related behavior  Description: INTERVENTIONS:  - Monitor physical status and assess for symptoms of withdrawal  - Administer medication as ordered  - Provide emotional support with 1 on 1 interaction with staff  - Encourage recovery focused program/ addiction education  - Assess for verbalization of changing behaviors related to substance abuse  - Initiate consults and referrals as appropriate (Case Management, Spiritual Care, etc )  Outcome: Progressing  Goal: By discharge, will develop insight into their chemical dependency and sustain motivation to continue in recovery  Description: INTERVENTIONS:  - Attends all daily group sessions and scheduled AA groups  - Actively practices coping skills through participation in the therapeutic community and adherence to program rules  - Reviews and completes assignments from individual treatment plan  - Assist patient development of understanding of their personal cycle of addiction and relapse triggers  Outcome: Progressing  Goal: By discharge, patient will have ongoing treatment plan addressing chemical dependency  Description: INTERVENTIONS:  - Assist patient with resources and/or appointments for ongoing recovery based living  Outcome: Progressing     Problem: INVOLUNTARY ADMIT  Goal: Will cooperate with staff recommendations and doctor's orders and will demonstrate appropriate behavior  Description: INTERVENTIONS:  - Treat underlying conditions and offer medication as ordered  - Educate regarding involuntary admission procedures and rules  - Utilize positive consistent limit setting strategies to support patient and staff safety  Outcome: Progressing     Problem: Ineffective Coping  Goal: Participates in unit activities  Description: Interventions:  - Provide therapeutic environment   - Provide required programming   - Redirect inappropriate behaviors   Outcome: Progressing     Problem: DISCHARGE PLANNING  Goal: Discharge to home or other facility with appropriate resources  Description: INTERVENTIONS:  - Identify barriers to discharge w/patient and caregiver  - Arrange for needed discharge resources and transportation as appropriate  - Identify discharge learning needs (meds, wound care, etc )  - Arrange for interpretive services to assist at discharge as needed  - Refer to Case Management Department for coordinating discharge planning if the patient needs post-hospital services based on physician/advanced practitioner order or complex needs related to functional status, cognitive ability, or social support system  Outcome: Progressing

## 2021-02-12 VITALS
DIASTOLIC BLOOD PRESSURE: 85 MMHG | SYSTOLIC BLOOD PRESSURE: 144 MMHG | HEIGHT: 68 IN | TEMPERATURE: 98.4 F | WEIGHT: 158.6 LBS | BODY MASS INDEX: 24.04 KG/M2 | HEART RATE: 72 BPM | RESPIRATION RATE: 16 BRPM

## 2021-02-12 PROCEDURE — 99239 HOSP IP/OBS DSCHRG MGMT >30: CPT | Performed by: PSYCHIATRY & NEUROLOGY

## 2021-02-12 RX ORDER — OLANZAPINE 20 MG/1
20 TABLET ORAL
Qty: 30 TABLET | Refills: 1 | Status: SHIPPED | OUTPATIENT
Start: 2021-02-12 | End: 2021-03-14

## 2021-02-12 RX ADMIN — IBUPROFEN 600 MG: 600 TABLET, FILM COATED ORAL at 05:45

## 2021-02-12 RX ADMIN — PANTOPRAZOLE SODIUM 20 MG: 20 TABLET, DELAYED RELEASE ORAL at 06:16

## 2021-02-12 RX ADMIN — ACETAMINOPHEN 975 MG: 325 TABLET ORAL at 08:13

## 2021-02-12 NOTE — NURSING NOTE
Patient has been in the unit for needs although he was more visible for the first hour or so of the evening shift  He is cooperative with all scheduled  medications  He is denying all psychiatric symptoms

## 2021-02-12 NOTE — DISCHARGE INSTR - LAB
If you smoke, use tobacco or nicotine, and/or are exposed to second hand smoke, you are encouraged to stop to improve your health    If you need help quitting, please talk to your health care provider or call:  · Odilon Peralta ([phone])  · [facility] ([phone])   · [address] [place] [address] ([phone])

## 2021-02-12 NOTE — NURSING NOTE
Patient about the unit this morning  Quiet and to himself  Currently denying symptoms and is looking forward to discharge today

## 2021-02-12 NOTE — CASE MANAGEMENT
[address]  services was called; provided pt's demographic and Insurance information  A Tele-Intake appointment is scheduled for Thursday, 2/18/21 at 10 AM with therapist Rg Hannon  This has been added to AVS follow up provider section  This provider requested AVS to be faxed at [phone]

## 2021-02-12 NOTE — CASE MANAGEMENT
Met with patient for discharge planning; reviewed learned coping skills during the hospital stay  Discussed effective use of learned Coping skills, support system such as [address], Calling 911 or going to nearest emergency for true emergency  Pt is informed that he has been provided with discharge meds; with one refill  Pt states  "I can't wait to go outside and take control of my life  I have to get ready for my son's  tomorrow"  Pt thanking staff for helping him with treatment at the unit  Discussed importance of compliance to treatment  Informed pt about his Intake appointment at [address] [place] [address] Liliana Chiu  Pt was encouraged to call PCP for medical issues  Pt declined D/A referral -pt is provided with info of D/A providers if he wishes to seek help in near future  Pt verbalized understanding of the plan  After discharge, pt will go to his friends and  his car  Pt plans to seek help from friend for couple of days  Otherwise, He plans to head to [facility]

## 2021-02-12 NOTE — TREATMENT TEAM
02/12/21 0842   Team Meeting   Meeting Type Daily Rounds   Team Members Present   Team Members Present Physician;Nurse;; Other (Discipline and Name)   Physician Team Member Dr LOAIZA   Nursing Team Member [address] [facility] Management Team Member Pauline Maciel   Other (Discipline and Name) Martinez   Patient/Family Present   Patient Present No   Patient's Family Present No     303, Pt calm, cooperative and compliant with meds and meals  Pt is visible on unit at times  Pt denies symptoms  Pt's d/c meds to be filled and provided prior to discharge today via [facility]

## 2021-02-12 NOTE — CASE MANAGEMENT
Pt's Ex-fiance Ewingelizabeth Patel was called at [phone]; left a voicemail with discharge notification along with this writer's contact details

## 2021-02-12 NOTE — PLAN OF CARE
Problem: DEPRESSION  Goal: Will be euthymic at discharge  Description: INTERVENTIONS:  - Administer medication as ordered  - Provide emotional support via 1:1 interaction with staff  - Encourage involvement in milieu/groups/activities  - Monitor for social isolation  Outcome: Completed     Problem: PSYCHOSIS  Goal: Will report no hallucinations or delusions  Description: Interventions:  - Administer medication as  ordered  - Every waking shifts and PRN assess for the presence of hallucinations and or delusions  - Assist with reality testing to support increasing orientation  - Assess if patient's hallucinations or delusions are encouraging self-harm or harm to others and intervene as appropriate  Outcome: Completed     Problem: SUBSTANCE USE/ABUSE  Goal: Will have no detox symptoms and will verbalize plan for changing substance-related behavior  Description: INTERVENTIONS:  - Monitor physical status and assess for symptoms of withdrawal  - Administer medication as ordered  - Provide emotional support with 1 on 1 interaction with staff  - Encourage recovery focused program/ addiction education  - Assess for verbalization of changing behaviors related to substance abuse  - Initiate consults and referrals as appropriate (Case Management, Spiritual Care, etc )  Outcome: Completed  Goal: By discharge, will develop insight into their chemical dependency and sustain motivation to continue in recovery  Description: INTERVENTIONS:  - Attends all daily group sessions and scheduled AA groups  - Actively practices coping skills through participation in the therapeutic community and adherence to program rules  - Reviews and completes assignments from individual treatment plan  - Assist patient development of understanding of their personal cycle of addiction and relapse triggers  Outcome: Completed  Goal: By discharge, patient will have ongoing treatment plan addressing chemical dependency  Description: INTERVENTIONS:  - Assist patient with resources and/or appointments for ongoing recovery based living  Outcome: Completed     Problem: INVOLUNTARY ADMIT  Goal: Will cooperate with staff recommendations and doctor's orders and will demonstrate appropriate behavior  Description: INTERVENTIONS:  - Treat underlying conditions and offer medication as ordered  - Educate regarding involuntary admission procedures and rules  - Utilize positive consistent limit setting strategies to support patient and staff safety  Outcome: Completed     Problem: Ineffective Coping  Goal: Participates in unit activities  Description: Interventions:  - Provide therapeutic environment   - Provide required programming   - Redirect inappropriate behaviors   Outcome: Completed     Problem: DISCHARGE PLANNING  Goal: Discharge to home or other facility with appropriate resources  Description: INTERVENTIONS:  - Identify barriers to discharge w/patient and caregiver  - Arrange for needed discharge resources and transportation as appropriate  - Identify discharge learning needs (meds, wound care, etc )  - Arrange for interpretive services to assist at discharge as needed  - Refer to Case Management Department for coordinating discharge planning if the patient needs post-hospital services based on physician/advanced practitioner order or complex needs related to functional status, cognitive ability, or social support system  Outcome: Completed

## 2021-02-13 NOTE — DISCHARGE SUMMARY
Discharge Summary - [facility] [place] 54 y o  male MRN: [id number]  Unit/Bed#: [facility] Encounter: [id number]     Admission Date: 01/22/2021        Discharge Date: 2/12/2021      Attending Psychiatrist: EVERARDO Mccullough  For the reason for admission and the  treament  In inpatient unit please review discharge summary dictated by Dr Dedra Cisse  The writer participated in patient care in the beginning of the patient inpatient treatment for his paranoid psychosis and mood instability  Today, the writer personally evaluated the patient prior to his planned discharge  The patient no longer expressed his paranoid delusions  He was cheerful he was positive regarding his future  He wanted to keep his "passed in the past   CBT based therapy was provided by the writer to continue to enforce patient positive thoughts  Motivational approach was also selected by the writer to help the patient to continue with medication management after his discharge  The patient was receptive      Mental Status at time of Discharge:     Mental Status Evaluation:    Appearance:  dressed appropriately, casually dressed   Behavior:  normal, pleasant, cooperative, calm   Mood:  euthymic   Affect: normal range and intensity, appropriate, reactive    Speech:  normal rate and volume   Language: appropriate   Thought Process:  normal   Associations: concrete associations   Thought Content:  normal, paranoid ideation is resolved   Perceptual Disturbances: no auditory hallucinations, no visual hallucinations, denies auditory hallucinations when asked, does not appear responding to internal stimuli   Risk Potential: Suicidal ideation - None  Homicidal ideation - None  Potential for aggression - No   Sensorium:  oriented to person, place and time   Memory:  recent and remote memory grossly intact   Consciousness:  alert and awake   Attention: attention span and concentration are normal   Intellect: normal   Fund of Knowledge: awareness of current events appropriate   Insight:  improved   Judgment: improved   Muscle Tone: normal   Gait/Station: normal gait/station and normal balance   Motor Activity: no abnormal movements         Discharge Diagnosis:   Patient Active Problem List   Diagnosis    Medical clearance for psychiatric admission    Paranoid schizophrenia [facility])       Discharge Medications:  See after visit summary for reconciled discharge medications provided to patient and family  Discharge instructions/Information to patient and family:   See after visit summary for information provided to patient and family  Provisions for Follow-Up Care:  See after visit summary for information related to follow-up care and any pertinent home health orders  Discharge Statement   I spent 30 minutes discharging the patient  This time was spent on the day of discharge  I had direct contact with the patient on the day of discharge  Additional documentation is required if more than 30 minutes were spent on discharge  Portions of the record may have been created with voice recognition software

## 2021-02-15 NOTE — CASE MANAGEMENT
AVS sent to [facility] via [employer] integrated fax system

## 2023-11-25 NOTE — ED NOTES
Neurology Initial Consult H&P  Neurohospitalist Service, [facility] Neurosciences    Referring Physician: Anabella Jacob Patient states he is going to have his boss, the superintendent of [employer] along with their primary stockholder come here with a  to take him home because there is nothing wrong with him        Janet Arroyo RN  01/22/21 9082 Chauncey Arguello III, MD  Vascular Neurology, [facility]

## 2024-06-05 ENCOUNTER — HOSPITAL ENCOUNTER (EMERGENCY)
Facility: HOSPITAL | Age: 59
Discharge: HOME | End: 2024-06-05
Attending: EMERGENCY MEDICINE
Payer: OTHER MISCELLANEOUS

## 2024-06-05 VITALS
TEMPERATURE: 97.9 F | RESPIRATION RATE: 18 BRPM | OXYGEN SATURATION: 98 % | SYSTOLIC BLOOD PRESSURE: 150 MMHG | DIASTOLIC BLOOD PRESSURE: 81 MMHG | HEART RATE: 76 BPM

## 2024-06-05 DIAGNOSIS — T78.40XA ALLERGIC REACTION, INITIAL ENCOUNTER: Primary | ICD-10-CM

## 2024-06-05 DIAGNOSIS — T63.441A BEE STING REACTION: ICD-10-CM

## 2024-06-05 PROCEDURE — 99283 EMERGENCY DEPT VISIT LOW MDM: CPT

## 2024-06-05 PROCEDURE — 96361 HYDRATE IV INFUSION ADD-ON: CPT

## 2024-06-05 PROCEDURE — 96374 THER/PROPH/DIAG INJ IV PUSH: CPT

## 2024-06-05 PROCEDURE — 96375 TX/PRO/DX INJ NEW DRUG ADDON: CPT

## 2024-06-05 PROCEDURE — 99284 EMERGENCY DEPT VISIT MOD MDM: CPT | Performed by: EMERGENCY MEDICINE

## 2024-06-05 RX ORDER — METHYLPREDNISOLONE SODIUM SUCCINATE 125 MG/2ML
125 INJECTION, POWDER, LYOPHILIZED, FOR SOLUTION INTRAMUSCULAR; INTRAVENOUS ONCE
Status: COMPLETED | OUTPATIENT
Start: 2024-06-05 | End: 2024-06-05

## 2024-06-05 RX ORDER — FAMOTIDINE 10 MG/ML
20 INJECTION, SOLUTION INTRAVENOUS ONCE
Status: COMPLETED | OUTPATIENT
Start: 2024-06-05 | End: 2024-06-05

## 2024-06-05 RX ORDER — DIPHENHYDRAMINE HYDROCHLORIDE 50 MG/ML
25 INJECTION INTRAMUSCULAR; INTRAVENOUS ONCE
Status: COMPLETED | OUTPATIENT
Start: 2024-06-05 | End: 2024-06-05

## 2024-06-05 RX ORDER — EPINEPHRINE 0.3 MG/.3ML
0.3 INJECTION SUBCUTANEOUS ONCE
Qty: 0.6 ML | Refills: 0 | Status: SHIPPED | OUTPATIENT
Start: 2024-06-05 | End: 2024-06-05

## 2024-06-05 RX ADMIN — FAMOTIDINE 20 MG: 10 INJECTION INTRAVENOUS at 10:23

## 2024-06-05 RX ADMIN — SODIUM CHLORIDE 1000 ML: 0.9 INJECTION, SOLUTION INTRAVENOUS at 10:33

## 2024-06-05 RX ADMIN — METHYLPREDNISOLONE SODIUM SUCCINATE 125 MG: 125 INJECTION, POWDER, FOR SOLUTION INTRAMUSCULAR; INTRAVENOUS at 10:23

## 2024-06-05 RX ADMIN — DIPHENHYDRAMINE HYDROCHLORIDE 25 MG: 50 INJECTION, SOLUTION INTRAMUSCULAR; INTRAVENOUS at 08:46

## 2024-06-05 NOTE — DISCHARGE INSTRUCTIONS
Please continue to monitor your symptoms at home, take benadryl as needed for itching, use ice for swelling.  epi pen at pharmacy, use if you begin to have any chest pain, shortness of breath, nausea/vomiting. If you use your epi pen, come to the ED after. Otherwise, follow with your PCP.

## 2024-06-05 NOTE — ED ATTENDING ATTESTATION
6/5/2024  I, Kristie Morton MD, saw and evaluated the patient. I have discussed the patient with the resident/non-physician practitioner and agree with the resident's/non-physician practitioner's findings, Plan of Care, and MDM as documented in the resident's/non-physician practitioner's note, except where noted. All available labs and Radiology studies were reviewed.  I was present for key portions of any procedure(s) performed by the resident/non-physician practitioner and I was immediately available to provide assistance.       At this point I agree with the current assessment done in the Emergency Department.  I have conducted an independent evaluation of this patient a history and physical is as follows:    59 yo male with h/o psychiatric illness and h/o bee sting allergy in past p/w lip swelling after multiple bee stings sustained while working.  Denies chest pain/pressure/voice change/drooling/difficulty swallowing/swelling of the tongue/SOB/n/v/d or other systemic complaints.  On exam notable swelling of the upper lip seen without any swelling in the posterior pharynx or tongue.  No stridor, clear breath sounds bilaterally, benign abdomen.  No notable rash on my eval.  Pt does have an Epi pen at home but has never used it nor does it sound like he has had a true anaphylactic reaction.  Pt observed at length with improved swelling and no decompensation.  Given steroids/benadryl/pepcid.  Safe for dispo.  New Epi pen prescribed.  Pt instructed to have low threshold to RTER for rash, increased swelling, voice change, wheezing, n/v/d or other new concerns or complaints.             Past Medical History:   Diagnosis Date    Psychiatric illness     Psychosis (HCC)            ED Course         Critical Care Time  Procedures

## 2024-06-06 NOTE — ED PROVIDER NOTES
History  Chief Complaint   Patient presents with    Allergic Reaction     Got stung by bees 45 mins pta. Has epi pen but did not have on him. +swelling on lips, face, arms, hands. Stings noted all over back. Pt ambulatory and denies throat swelling     HPI    Patient is a 58-year-old male with noncontributory past medical history presenting with multiple bee stings and allergic reaction.  He states that he is allergic to bees, but reactions in the past have typically been local and he has never had to use an EpiPen before.  He does have an EpiPen at home and thought about using it today due to the number of stings, but because it was  he did not.  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  Describes diffuse itching, swelling in his lips.  Denies any chest pain, shortness of breath, nausea or vomiting.    Prior to Admission Medications   Prescriptions Last Dose Informant Patient Reported? Taking?   OLANZapine (ZyPREXA) 20 MG tablet   No No   Sig: Take 1 tablet (20 mg total) by mouth daily at bedtime   divalproex sodium (DEPAKOTE ER) 500 mg 24 hr tablet   No No   Sig: Take 3 tablets (1,500 mg total) by mouth daily at bedtime   nicotine polacrilex (NICORETTE) 2 mg gum   No No   Sig: Chew 1 each (2 mg total) every 2 (two) hours as needed for smoking cessation Do not exceed 24 pieces in 24 hours.   pantoprazole (PROTONIX) 20 mg tablet   No No   Sig: Take 1 tablet (20 mg total) by mouth daily      Facility-Administered Medications: None       Past Medical History:   Diagnosis Date    Psychiatric illness     Psychosis (HCC)        History reviewed. No pertinent surgical history.    History reviewed. No pertinent family history.  I have reviewed and agree with the history as documented.    E-Cigarette/Vaping    E-Cigarette Use Never User      E-Cigarette/Vaping Substances    Nicotine No     THC No     CBD No     Flavoring No     Other No     Unknown No      Social  History     Tobacco Use    Smoking status: Light Smoker     Current packs/day: 0.20     Types: Cigarettes    Smokeless tobacco: Current   Vaping Use    Vaping status: Never Used   Substance Use Topics    Alcohol use: Not Currently     Comment: socially    Drug use: Yes     Types: Methamphetamines        Review of Systems   Constitutional:  Negative for chills and fever.   HENT:  Positive for facial swelling. Negative for ear pain and sore throat.    Eyes:  Negative for pain and visual disturbance.   Respiratory:  Negative for cough and shortness of breath.    Cardiovascular:  Negative for chest pain and palpitations.   Gastrointestinal:  Negative for abdominal pain and vomiting.   Genitourinary:  Negative for dysuria and hematuria.   Musculoskeletal:  Negative for arthralgias and back pain.   Skin:  Positive for rash. Negative for color change.        itching   Neurological:  Negative for syncope and light-headedness.   All other systems reviewed and are negative.      Physical Exam  ED Triage Vitals   Temperature Pulse Respirations Blood Pressure SpO2   06/05/24 0810 06/05/24 0810 06/05/24 0810 06/05/24 0849 06/05/24 0810   97.9 °F (36.6 °C) 78 18 152/97 98 %      Temp Source Heart Rate Source Patient Position - Orthostatic VS BP Location FiO2 (%)   06/05/24 0810 06/05/24 0810 06/05/24 1130 06/05/24 1130 --   Oral Monitor Sitting Right arm       Pain Score       --                    Orthostatic Vital Signs  Vitals:    06/05/24 0930 06/05/24 1000 06/05/24 1130 06/05/24 1200   BP: 134/86 131/87 128/74 150/81   Pulse:   73 76   Patient Position - Orthostatic VS:   Sitting Sitting       Physical Exam  Vitals and nursing note reviewed.   Constitutional:       General: He is not in acute distress.     Appearance: He is well-developed.   HENT:      Head: Normocephalic and atraumatic.      Mouth/Throat:      Comments: Upper lip swollen, no oropharyngeal edema, stridor noted  Eyes:      Conjunctiva/sclera: Conjunctivae  normal.   Neck:      Comments: Bee stings visualized on neck  Cardiovascular:      Rate and Rhythm: Normal rate and regular rhythm.      Heart sounds: No murmur heard.  Pulmonary:      Effort: Pulmonary effort is normal. No respiratory distress.      Breath sounds: Normal breath sounds. No wheezing.   Abdominal:      Palpations: Abdomen is soft.      Tenderness: There is no abdominal tenderness.   Musculoskeletal:         General: No swelling.   Skin:     General: Skin is warm and dry.      Capillary Refill: Capillary refill takes less than 2 seconds.      Findings: Rash present.      Comments: Urticarial response to multiple bee stings visualized on back, neck, face including lip, ear, upper extremities.   Neurological:      Mental Status: He is alert.   Psychiatric:         Mood and Affect: Mood normal.         ED Medications  Medications   diphenhydrAMINE (BENADRYL) injection 25 mg (25 mg Intravenous Given 6/5/24 0846)   methylPREDNISolone sodium succinate (Solu-MEDROL) injection 125 mg (125 mg Intravenous Given 6/5/24 1023)   Famotidine (PF) (PEPCID) injection 20 mg (20 mg Intravenous Given 6/5/24 1023)   sodium chloride 0.9 % bolus 1,000 mL (0 mL Intravenous Stopped 6/5/24 1236)       Diagnostic Studies  Results Reviewed       None                   No orders to display         Procedures  Procedures      ED Course                             SBIRT 22yo+      Flowsheet Row Most Recent Value   Initial Alcohol Screen: US AUDIT-C     1. How often do you have a drink containing alcohol? 0 Filed at: 06/05/2024 0817   2. How many drinks containing alcohol do you have on a typical day you are drinking?  0 Filed at: 06/05/2024 0817   3a. Male UNDER 65: How often do you have five or more drinks on one occasion? 0 Filed at: 06/05/2024 0817   3b. FEMALE Any Age, or MALE 65+: How often do you have 4 or more drinks on one occassion? 0 Filed at: 06/05/2024 0817   Audit-C Score 0 Filed at: 06/05/2024 0817   XIOMARA: How many  times in the past year have you...    Used an illegal drug or used a prescription medication for non-medical reasons? Never Filed at: 06/05/2024 0817                  Medical Decision Making  58-year-old male patient with history of allergic reaction to bee stings presenting with multiple bee stings while at work.  On initial evaluation, the patient did not appear to be in acute distress.  His vital signs were within normal limits including with stable blood pressure.  Physical examination showed multiple sites consistent with insect bites, no stingers were visualized still in skin.  He did have surrounding urticarial rash and most notably some mild swelling to the upper lip.  He did not exhibit any signs of respiratory distress on evaluation and was not reporting second system involvement consistent with anaphylactic reaction.  Patient was given dose of IV Benadryl, steroids and observed for some time in the emergency department.  Overall reaction did improve, lip swelling minimally improved with ice pack.  Given no acute worsening of symptoms or progression to anaphylaxis, it was determined the patient could be discharged in stable condition with follow-up with his PCP.  He was counseled to return should symptoms acutely worsen.  At time of discharge, he was in agreement with plan.    Problems Addressed:  Allergic reaction, initial encounter: acute illness or injury  Bee sting reaction: acute illness or injury    Risk  Prescription drug management.          Disposition  Final diagnoses:   Allergic reaction, initial encounter   Bee sting reaction     Time reflects when diagnosis was documented in both MDM as applicable and the Disposition within this note       Time User Action Codes Description Comment    6/5/2024 11:52 AM Verona Alcantara [T78.40XA] Allergic reaction, initial encounter     6/5/2024 11:52 AM Verona Alcantara [T63.441A] Bee sting reaction           ED Disposition       ED Disposition    Discharge    Condition   Stable    Date/Time   Wed Jun 5, 2024 1152    Comment   Cruz Ybarra discharge to home/self care.                   Follow-up Information       Follow up With Specialties Details Why Contact Info Additional Information    ABEBE Will MD Internal Medicine Call  As needed [address]  [address]  [facility] [address]  [phone]       [facility] Emergency Department Emergency Medicine Go to  If symptoms worsen [address] [facility] [address]  [phone] [facility] Emergency Department, [address] [place], [place], [address]            Discharge Medication List as of 6/5/2024 11:55 AM        START taking these medications    Details   EPINEPHrine (EPIPEN) 0.3 mg/0.3 mL SOAJ Inject 0.3 mL (0.3 mg total) into a muscle once for 1 dose, Starting Wed 6/5/2024, Normal           CONTINUE these medications which have NOT CHANGED    Details   divalproex sodium (DEPAKOTE ER) 500 mg 24 hr tablet Take 3 tablets (1,500 mg total) by mouth daily at bedtime, Starting Thu 2/11/2021, Until Sat 3/13/2021, Normal      nicotine polacrilex (NICORETTE) 2 mg gum Chew 1 each (2 mg total) every 2 (two) hours as needed for smoking cessation Do not exceed 24 pieces in 24 hours., Starting Fri 2/12/2021, Normal      OLANZapine (ZyPREXA) 20 MG tablet Take 1 tablet (20 mg total) by mouth daily at bedtime, Starting Fri 2/12/2021, Until Sun 3/14/2021, Normal      pantoprazole (PROTONIX) 20 mg tablet Take 1 tablet (20 mg total) by mouth daily, Starting Thu 2/11/2021, Until Sat 3/13/2021, Normal           No discharge procedures on file.    PDMP Review         Value Time User    PDMP Reviewed  Yes 2/12/2021  9:55 AM Matt Rivero MD             ED Provider  Attending physically available and evaluated Cruz Ybarra. I managed the patient along with the ED Attending.    Electronically Signed by           Verona Ramsay  Yong Alcantara DO  06/06/24 2782

## 2025-04-10 NOTE — CASE MANAGEMENT
303 hearing was held over the phone with [facility]  [id number] petitioner provided testimony  Physician recommended inpatient treatment up to 20 days  Pt was not in agreement with the 303 petition  Pt was loud but redirectable  Hearing lasted about 42 minutes  303 upheld up to 20 days  [Follow-up Visit ___] : a follow-up visit  for [unfilled]

## 2025-06-24 NOTE — ED NOTES
Patient is agitated asking to make calls, portable phone provided  Patient stated he needed phone numbers from his phone, phone was obtained from his belongings and patient was allowed to obtain phone numbers prior to returning belongings to [facility]  Patient requested copy of "court order" and was provided a copy of 302  Patient is loud, yelling on the phone pacing in the common area  Patient advised to stay in his room while on the phone        Ruby Novoa RN  01/22/21 1387 N/A Patient is under age 18 and does not have a history of high risk behavior or is not high risk for Hep C